# Patient Record
Sex: FEMALE | Race: WHITE | Employment: PART TIME | URBAN - METROPOLITAN AREA
[De-identification: names, ages, dates, MRNs, and addresses within clinical notes are randomized per-mention and may not be internally consistent; named-entity substitution may affect disease eponyms.]

---

## 2024-01-23 ENCOUNTER — APPOINTMENT (OUTPATIENT)
Dept: CT IMAGING | Age: 69
DRG: 552 | End: 2024-01-23
Payer: MEDICARE

## 2024-01-23 ENCOUNTER — APPOINTMENT (OUTPATIENT)
Dept: MRI IMAGING | Age: 69
DRG: 552 | End: 2024-01-23
Payer: MEDICARE

## 2024-01-23 ENCOUNTER — HOSPITAL ENCOUNTER (INPATIENT)
Age: 69
LOS: 1 days | Discharge: HOME | DRG: 552 | End: 2024-01-24
Attending: EMERGENCY MEDICINE | Admitting: SURGERY
Payer: MEDICARE

## 2024-01-23 DIAGNOSIS — S12.100A ODONTOID FRACTURE, CLOSED, INITIAL ENCOUNTER (HCC): ICD-10-CM

## 2024-01-23 DIAGNOSIS — S12.110A CLOSED TYPE II FRACTURE OF ODONTOID PROCESS (HCC): Primary | ICD-10-CM
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
## 2024-01-23 LAB
25(OH)D3 SERPL-MCNC: 42.2 NG/ML
ALBUMIN SERPL-MCNC: 4.1 G/DL (ref 3.5–5.2)
ANION GAP SERPL CALCULATED.3IONS-SCNC: 13 MMOL/L (ref 9–17)
BODY TEMPERATURE: 37
BUN SERPL-MCNC: 17 MG/DL (ref 8–23)
CHLORIDE SERPL-SCNC: 105 MMOL/L (ref 98–107)
CO2 SERPL-SCNC: 22 MMOL/L (ref 20–31)
COHGB MFR BLD: 2.7 % (ref 0–5)
CREAT SERPL-MCNC: 0.9 MG/DL (ref 0.5–0.9)
ERYTHROCYTE [DISTWIDTH] IN BLOOD BY AUTOMATED COUNT: 13.2 % (ref 11.8–14.4)
EST. AVERAGE GLUCOSE BLD GHB EST-MCNC: 128 MG/DL
ETHANOL PERCENT: <0.01 %
ETHANOLAMINE SERPL-MCNC: <10 MG/DL
FIO2 ON VENT: ABNORMAL %
GFR SERPL CREATININE-BSD FRML MDRD: >60 ML/MIN/1.73M2
GLUCOSE SERPL-MCNC: 102 MG/DL (ref 70–99)
HBA1C MFR BLD: 6.1 % (ref 4–6)
HCG SERPL QL: NEGATIVE
HCO3 VENOUS: 24 MMOL/L (ref 24–30)
HCT VFR BLD AUTO: 43.6 % (ref 36.3–47.1)
HGB BLD-MCNC: 14.2 G/DL (ref 11.9–15.1)
INR PPP: 1.1
MCH RBC QN AUTO: 29.9 PG (ref 25.2–33.5)
MCHC RBC AUTO-ENTMCNC: 32.6 G/DL (ref 28.4–34.8)
MCV RBC AUTO: 91.8 FL (ref 82.6–102.9)
NEGATIVE BASE EXCESS, VEN: 0.6 MMOL/L (ref 0–2)
NRBC BLD-RTO: 0 PER 100 WBC
O2 SAT, VEN: 84.4 % (ref 60–85)
PARTIAL THROMBOPLASTIN TIME: 27.1 SEC (ref 23–36.5)
PCO2, VEN: 41.4 MM HG (ref 39–55)
PH VENOUS: 7.38 (ref 7.32–7.42)
PLATELET # BLD AUTO: 202 K/UL (ref 138–453)
PMV BLD AUTO: 10 FL (ref 8.1–13.5)
PO2, VEN: 48 MM HG (ref 30–50)
POTASSIUM SERPL-SCNC: 3.4 MMOL/L (ref 3.7–5.3)
PROTHROMBIN TIME: 14.3 SEC (ref 11.7–14.9)
RBC # BLD AUTO: 4.75 M/UL (ref 3.95–5.11)
SODIUM SERPL-SCNC: 140 MMOL/L (ref 135–144)
T4 FREE SERPL-MCNC: 1.3 NG/DL (ref 0.9–1.7)
TSH SERPL DL<=0.05 MIU/L-ACNC: 1.46 UIU/ML (ref 0.3–5)
WBC OTHER # BLD: 9.1 K/UL (ref 3.5–11.3)

## 2024-01-23 PROCEDURE — 6360000004 HC RX CONTRAST MEDICATION: Performed by: NURSE PRACTITIONER

## 2024-01-23 PROCEDURE — 84439 ASSAY OF FREE THYROXINE: CPT

## 2024-01-23 PROCEDURE — 85610 PROTHROMBIN TIME: CPT

## 2024-01-23 PROCEDURE — 84520 ASSAY OF UREA NITROGEN: CPT

## 2024-01-23 PROCEDURE — 82805 BLOOD GASES W/O2 SATURATION: CPT

## 2024-01-23 PROCEDURE — 70498 CT ANGIOGRAPHY NECK: CPT

## 2024-01-23 PROCEDURE — 85730 THROMBOPLASTIN TIME PARTIAL: CPT

## 2024-01-23 PROCEDURE — 84443 ASSAY THYROID STIM HORMONE: CPT

## 2024-01-23 PROCEDURE — 85027 COMPLETE CBC AUTOMATED: CPT

## 2024-01-23 PROCEDURE — 2580000003 HC RX 258: Performed by: NURSE PRACTITIONER

## 2024-01-23 PROCEDURE — 84703 CHORIONIC GONADOTROPIN ASSAY: CPT

## 2024-01-23 PROCEDURE — 82565 ASSAY OF CREATININE: CPT

## 2024-01-23 PROCEDURE — 1200000000 HC SEMI PRIVATE

## 2024-01-23 PROCEDURE — 82040 ASSAY OF SERUM ALBUMIN: CPT

## 2024-01-23 PROCEDURE — G0480 DRUG TEST DEF 1-7 CLASSES: HCPCS

## 2024-01-23 PROCEDURE — 6370000000 HC RX 637 (ALT 250 FOR IP): Performed by: NURSE PRACTITIONER

## 2024-01-23 PROCEDURE — 83036 HEMOGLOBIN GLYCOSYLATED A1C: CPT

## 2024-01-23 PROCEDURE — 72141 MRI NECK SPINE W/O DYE: CPT

## 2024-01-23 PROCEDURE — 6370000000 HC RX 637 (ALT 250 FOR IP)

## 2024-01-23 PROCEDURE — 82306 VITAMIN D 25 HYDROXY: CPT

## 2024-01-23 PROCEDURE — 80051 ELECTROLYTE PANEL: CPT

## 2024-01-23 PROCEDURE — 99285 EMERGENCY DEPT VISIT HI MDM: CPT

## 2024-01-23 PROCEDURE — 99284 EMERGENCY DEPT VISIT MOD MDM: CPT | Performed by: SURGERY

## 2024-01-23 PROCEDURE — 82947 ASSAY GLUCOSE BLOOD QUANT: CPT

## 2024-01-23 RX ORDER — OMEGA-3S/DHA/EPA/FISH OIL/D3 300MG-1000
400 CAPSULE ORAL DAILY
Status: DISCONTINUED | OUTPATIENT
Start: 2024-01-23 | End: 2024-01-24 | Stop reason: HOSPADM

## 2024-01-23 RX ORDER — POTASSIUM CHLORIDE 29.8 MG/ML
20 INJECTION INTRAVENOUS PRN
Status: DISCONTINUED | OUTPATIENT
Start: 2024-01-23 | End: 2024-01-24 | Stop reason: HOSPADM

## 2024-01-23 RX ORDER — ONDANSETRON 2 MG/ML
4 INJECTION INTRAMUSCULAR; INTRAVENOUS EVERY 6 HOURS PRN
Status: DISCONTINUED | OUTPATIENT
Start: 2024-01-23 | End: 2024-01-24 | Stop reason: HOSPADM

## 2024-01-23 RX ORDER — POLYETHYLENE GLYCOL 3350 17 G/17G
17 POWDER, FOR SOLUTION ORAL DAILY
Status: DISCONTINUED | OUTPATIENT
Start: 2024-01-23 | End: 2024-01-24 | Stop reason: HOSPADM

## 2024-01-23 RX ORDER — ONDANSETRON 4 MG/1
4 TABLET, ORALLY DISINTEGRATING ORAL EVERY 8 HOURS PRN
Status: DISCONTINUED | OUTPATIENT
Start: 2024-01-23 | End: 2024-01-24 | Stop reason: HOSPADM

## 2024-01-23 RX ORDER — SODIUM CHLORIDE 0.9 % (FLUSH) 0.9 %
5-40 SYRINGE (ML) INJECTION EVERY 12 HOURS SCHEDULED
Status: DISCONTINUED | OUTPATIENT
Start: 2024-01-23 | End: 2024-01-24 | Stop reason: HOSPADM

## 2024-01-23 RX ORDER — METHOCARBAMOL 750 MG/1
750 TABLET, FILM COATED ORAL EVERY 8 HOURS
Status: DISCONTINUED | OUTPATIENT
Start: 2024-01-23 | End: 2024-01-24 | Stop reason: HOSPADM

## 2024-01-23 RX ORDER — SODIUM CHLORIDE 9 MG/ML
INJECTION, SOLUTION INTRAVENOUS CONTINUOUS
Status: CANCELLED | OUTPATIENT
Start: 2024-01-23

## 2024-01-23 RX ORDER — ATORVASTATIN CALCIUM 10 MG/1
10 TABLET, FILM COATED ORAL EVERY EVENING
COMMUNITY

## 2024-01-23 RX ORDER — RAMIPRIL 10 MG/1
10 CAPSULE ORAL EVERY EVENING
COMMUNITY

## 2024-01-23 RX ORDER — AMLODIPINE BESYLATE 5 MG/1
5 TABLET ORAL EVERY EVENING
COMMUNITY

## 2024-01-23 RX ORDER — SODIUM CHLORIDE 0.9 % (FLUSH) 0.9 %
10 SYRINGE (ML) INJECTION PRN
Status: DISCONTINUED | OUTPATIENT
Start: 2024-01-23 | End: 2024-01-24 | Stop reason: HOSPADM

## 2024-01-23 RX ORDER — OXYCODONE HYDROCHLORIDE 5 MG/1
5 TABLET ORAL ONCE
Status: COMPLETED | OUTPATIENT
Start: 2024-01-23 | End: 2024-01-23

## 2024-01-23 RX ORDER — ASPIRIN 81 MG/1
81 TABLET, CHEWABLE ORAL ONCE
Status: COMPLETED | OUTPATIENT
Start: 2024-01-23 | End: 2024-01-23

## 2024-01-23 RX ORDER — ACETAMINOPHEN 325 MG/1
650 TABLET ORAL EVERY 4 HOURS PRN
Status: DISCONTINUED | OUTPATIENT
Start: 2024-01-23 | End: 2024-01-24 | Stop reason: HOSPADM

## 2024-01-23 RX ORDER — AMLODIPINE BESYLATE 5 MG/1
5 TABLET ORAL NIGHTLY
Status: DISCONTINUED | OUTPATIENT
Start: 2024-01-23 | End: 2024-01-24 | Stop reason: HOSPADM

## 2024-01-23 RX ORDER — OXYCODONE HYDROCHLORIDE 5 MG/1
5 TABLET ORAL EVERY 4 HOURS PRN
Status: DISCONTINUED | OUTPATIENT
Start: 2024-01-23 | End: 2024-01-24 | Stop reason: HOSPADM

## 2024-01-23 RX ORDER — MAGNESIUM SULFATE IN WATER 40 MG/ML
2000 INJECTION, SOLUTION INTRAVENOUS PRN
Status: DISCONTINUED | OUTPATIENT
Start: 2024-01-23 | End: 2024-01-24 | Stop reason: HOSPADM

## 2024-01-23 RX ORDER — GABAPENTIN 100 MG/1
100 CAPSULE ORAL 3 TIMES DAILY
Status: DISCONTINUED | OUTPATIENT
Start: 2024-01-23 | End: 2024-01-24 | Stop reason: HOSPADM

## 2024-01-23 RX ORDER — ASPIRIN 81 MG/1
81 TABLET ORAL EVERY EVENING
COMMUNITY

## 2024-01-23 RX ORDER — ASCORBIC ACID 500 MG
500 TABLET ORAL DAILY
Status: DISCONTINUED | OUTPATIENT
Start: 2024-01-23 | End: 2024-01-24 | Stop reason: HOSPADM

## 2024-01-23 RX ORDER — SODIUM CHLORIDE 9 MG/ML
INJECTION, SOLUTION INTRAVENOUS PRN
Status: DISCONTINUED | OUTPATIENT
Start: 2024-01-23 | End: 2024-01-24 | Stop reason: HOSPADM

## 2024-01-23 RX ORDER — LISINOPRIL 10 MG/1
10 TABLET ORAL DAILY
Status: DISCONTINUED | OUTPATIENT
Start: 2024-01-23 | End: 2024-01-24 | Stop reason: HOSPADM

## 2024-01-23 RX ORDER — ATORVASTATIN CALCIUM 20 MG/1
10 TABLET, FILM COATED ORAL NIGHTLY
Status: DISCONTINUED | OUTPATIENT
Start: 2024-01-23 | End: 2024-01-24 | Stop reason: HOSPADM

## 2024-01-23 RX ORDER — POTASSIUM CHLORIDE 7.45 MG/ML
10 INJECTION INTRAVENOUS PRN
Status: DISCONTINUED | OUTPATIENT
Start: 2024-01-23 | End: 2024-01-24 | Stop reason: HOSPADM

## 2024-01-23 RX ADMIN — GABAPENTIN 100 MG: 100 CAPSULE ORAL at 20:34

## 2024-01-23 RX ADMIN — OXYCODONE HYDROCHLORIDE AND ACETAMINOPHEN 500 MG: 500 TABLET ORAL at 20:53

## 2024-01-23 RX ADMIN — LISINOPRIL 10 MG: 10 TABLET ORAL at 20:34

## 2024-01-23 RX ADMIN — ATORVASTATIN CALCIUM 10 MG: 10 TABLET, FILM COATED ORAL at 20:34

## 2024-01-23 RX ADMIN — OXYCODONE HYDROCHLORIDE 5 MG: 5 TABLET ORAL at 14:09

## 2024-01-23 RX ADMIN — ACETAMINOPHEN 650 MG: 325 TABLET ORAL at 20:33

## 2024-01-23 RX ADMIN — ASPIRIN 81 MG: 81 TABLET, CHEWABLE ORAL at 20:34

## 2024-01-23 RX ADMIN — SODIUM CHLORIDE, PRESERVATIVE FREE 10 ML: 5 INJECTION INTRAVENOUS at 21:19

## 2024-01-23 RX ADMIN — AMLODIPINE BESYLATE 5 MG: 10 TABLET ORAL at 20:31

## 2024-01-23 RX ADMIN — IOPAMIDOL 90 ML: 755 INJECTION, SOLUTION INTRAVENOUS at 15:21

## 2024-01-23 RX ADMIN — CHOLECALCIFEROL TAB 10 MCG (400 UNIT) 400 UNITS: 10 TAB at 20:53

## 2024-01-23 ASSESSMENT — ENCOUNTER SYMPTOMS
RESPIRATORY NEGATIVE: 1
BACK PAIN: 0
GASTROINTESTINAL NEGATIVE: 1
EYES NEGATIVE: 1

## 2024-01-23 ASSESSMENT — PAIN SCALES - GENERAL
PAINLEVEL_OUTOF10: 8
PAINLEVEL_OUTOF10: 5
PAINLEVEL_OUTOF10: 5

## 2024-01-23 ASSESSMENT — PAIN DESCRIPTION - LOCATION
LOCATION: NECK
LOCATION: HEAD;NECK

## 2024-01-23 ASSESSMENT — PAIN DESCRIPTION - DESCRIPTORS: DESCRIPTORS: ACHING

## 2024-01-23 ASSESSMENT — PAIN DESCRIPTION - ORIENTATION
ORIENTATION: MID
ORIENTATION: MID;LEFT;RIGHT

## 2024-01-23 NOTE — PROGRESS NOTES
Trauma Tertiary Survey    Admit Date: 1/23/2024  Hospital day 0      Subjective:     Patient seen and examined at bedside. Aspen collar on, no pain.     Objective:     Physical Exam  HENT:      Head: Normocephalic.      Right Ear: Tympanic membrane normal.      Left Ear: Tympanic membrane normal.      Mouth/Throat:      Mouth: Mucous membranes are moist.      Pharynx: Oropharynx is clear.   Eyes:      Extraocular Movements: Extraocular movements intact.      Pupils: Pupils are equal, round, and reactive to light.   Neck:      Comments: Aspen collar. Midline cervical tenderness.   Cardiovascular:      Rate and Rhythm: Normal rate.   Pulmonary:      Effort: Pulmonary effort is normal.   Abdominal:      Palpations: Abdomen is soft.   Musculoskeletal:      Comments: weakness in left foot   Skin:     Comments: Ecchymosis overlying dorsal aspect of left hand    Neurological:      Mental Status: She is alert and oriented to person, place, and time.     Spine:     Spine Tenderness ROM   Cervical 0 /10 Aspen Collar   Thoracic 0 /10 Aspen Collar   Lumbar 0 /10 [place] Collar     Musculoskeletal    Joint Tenderness Swelling ROM   Right shoulder absent absent normal   Left shoulder absent absent normal   Right elbow absent absent normal   Left elbow absent absent normal   Right wrist absent absent normal   Left wrist absent absent normal   Right hand grasp absent absent normal   Left hand grasp absent absent normal   Right hip absent absent normal   Left hip absent absent normal   Right knee absent absent normal   Left knee absent absent normal   Right ankle absent absent normal   Left ankle absent absent normal   Right foot absent absent normal   Left foot absent absent normal       CONSULTS: Neurosurgery     PROCEDURES: None     [x] Reviewed radiology reports  (All radiology findings correlate to diagnoses/problem list)    [] Incidental findings:    [] Patient/family notified and letter given    Assessment/Plan:     Minimally

## 2024-01-23 NOTE — ED PROVIDER NOTES
[facility]  Emergency Department  Faculty Attestation     I performed a history and physical examination of the patient and discussed management with the resident. I reviewed the resident’s note and agree with the documented findings and plan of care. Any areas of disagreement are noted on the chart. I was personally present for the key portions of any procedures. I have documented in the chart those procedures where I was not present during the key portions. I have reviewed the emergency nurses triage note. I agree with the chief complaint, past medical history, past surgical history, allergies, medications, social and family history as documented unless otherwise noted below.    For Physician Assistant/ Nurse Practitioner cases/documentation I have personally evaluated this patient and have completed at least one if not all key elements of the E/M (history, physical exam, and MDM). Additional findings are as noted.    Preliminary note started at 1:41 PM EST    Primary Care Physician:  No primary care provider on file.    Screenings:  [unfilled]    CHIEF COMPLAINT       Chief Complaint   Patient presents with    Neck Injury     Odontoid fracture       RECENT VITALS:   There were no vitals taken for this visit.    LABS:  Labs Reviewed - No data to display    Radiology  No orders to display         Attending Physician Additional  Notes    Patient is transferred for consultations for type II odontoid fracture.  She bumped her head several days ago and has persistent neck pain.  No loss of consciousness or neurologic complaints.  No numbness or weakness.  She has minimal residual deficits after a an old stroke with mild left-sided weakness more foot drop and hand weakness.  No chest pain shortness of breath.  No use of anticoagulation.  She bumped her right knee and has some bruising but full range of movement and able to bear weight.  She had CT brain which was negative at

## 2024-01-23 NOTE — PROGRESS NOTES
15 Variable Trauma-Specific Frailty Index   Comorbidities   Cancer History Yes (1) No (0)   Coronary Heart Disease MI (1) CABG (0.75) Mild (0.25)  No (0)   Dementia Severe (1) Moderate (0.5) Mild (0.25)  No (0)   Daily Activities   Help With Grooming Yes (1) No (0)   Help with Managing Money Yes (1) No (0)   Help doing Housework Yes (1) No (0)   Help with Toileting Yes (1) No (0)   Help Walking Wheelchair (1) Walker (0.75) Cane (0.5) No (0)   Health Attitude   Feel Less Useful Most Time (1) Sometimes (0.5) Never (0)   Feel Sad Most Time (1) Sometimes (0.5) Never (0)   Feel Effort to Do Everything Most Time (1) Sometimes (0.5) Never (0)   Feels Lonely Most Time (1) Sometimes (0.5) Never (0)   Falls Most Time (1) Sometimes (0.5) Never (0)   Function   Sexually Active Yes (0)  No(1)   Nutrition   Albumin < 3g/dL (1)  > 3g/dL   Scoring   Score   FI (Score/15)   > 0.25 = Frail   *Based on 2-weeks prior to hospital admission   Trauma Specific Fraility Index > or = to 4 (4/15 = 0.26)  Trauma Specific Fraility Index Score:    0

## 2024-01-23 NOTE — H&P
Neurological:      Mental Status: She is alert and oriented to person, place, and time.        RADIOLOGY  CTA NECK W CONTRAST    (Results Pending)       LABS  Labs Reviewed   HEMOGLOBIN A1C - Abnormal; Notable for the following components:       Result Value    Hemoglobin A1C 6.1 (*)     All other components within normal limits   TRAUMA PANEL   ALBUMIN   TSH WITH REFLEX   T4, FREE   VITAMIN D 25 HYDROXY       Emiliano Cui MD  3:09 PM  01/23/24

## 2024-01-23 NOTE — PROGRESS NOTES
[facility]     Emergency/Trauma Note    PATIENT NAME: Frieda Staples    Shift date: 2024   Shift day: Tuesday   Shift # 1    Room #    Name: Frieda Staples            Age: 68 y.o.  Gender: female          Buddhism: Tenriism   Place of Anabaptist:     Trauma/Incident type: Adult Trauma Consult  Admit Date & Time: 2024  1:32 PM      PATIENT/EVENT DESCRIPTION:  Frieda Staples is a 68 y.o. female who arrived as a transfer from [facility]. Pt fell at home two days ago.  Pt to be admitted to .         SPIRITUAL ASSESSMENT-INTERVENTION-OUTCOME:  Pt was awake and alert and wearing a C Collar. She engaged well in conversation discussing her injury and stating that she is declining surgery.  Pt is a PTA and feels she can handle her recovery without surgery. She spoke of having both knees replaced and of breaking her femur from a fall on ice a couple of years ago. Pt shared that both her  and brother  from falling and hitting their heads. She said she has no family and that friends will pick her up from here.  noticed after visit that a brother is listed as an emergency contact.  provided a supportive presence through active listening and words of affirmation.  assured pt of prayers and continued support as needed.      PATIENT BELONGINGS:  No belongings noted    ANY BELONGINGS OF SIGNIFICANT VALUE NOTED:      REGISTRATION STAFF NOTIFIED?  No      WHAT IS YOUR SPIRITUAL CARE PLAN FOR THIS PATIENT?:   Chaplains will remain available to offer spiritual and emotional support as needed.     Electronically signed by Chaplain Jason, on 2024 at 3:55 PM.  [facility]  [phone]

## 2024-01-23 NOTE — ED PROVIDER NOTES
[facility] ED  Emergency Department Encounter  Emergency Medicine Resident     Pt Name:Frieda Staples  MRN: [id number]  Birthdate 1955  Date of evaluation: 1/23/24  PCP:  No primary care provider on file.  Note Started: 2:49 PM EST      CHIEF COMPLAINT       Chief Complaint   Patient presents with    Neck Injury     Odontoid fracture       HISTORY OF PRESENT ILLNESS  (Location/Symptom, Timing/Onset, Context/Setting, Quality, Duration, Modifying Factors, Severity.)      Frieda Staples is a 68 y.o. female with past medical history of hypertension and prior stroke with minimal left-sided residual deficits who presents as a transfer from [facility] for further evaluation of neck fracture.  Patient explains that she had a mechanical trip and fall on Sunday, 2 days ago, and hit her head on a door.  She denies losing consciousness.  Reports that she was able to get up and ambulate without difficulty.  States that she helped the pain would resolve but it was ongoing, prompting her to present to the ED today for further evaluation.  She was found to have a type II odontoid fracture.  Transferred here for further evaluation.  Here, patient rates her pain as a 5 out of 10 in severity while at rest but a 10 out of 10 in severity with movement.  She arrives in a hard cervical collar.  She denies pain elsewhere.    PAST MEDICAL / SURGICAL / SOCIAL / FAMILY HISTORY      has no past medical history on file.       has a past surgical history that includes Hysterectomy.      Social History     Socioeconomic History    Marital status:      Spouse name: Not on file    Number of children: Not on file    Years of education: Not on file    Highest education level: Not on file   Occupational History    Not on file   Tobacco Use    Smoking status: Never    Smokeless tobacco: Not on file   Substance and Sexual Activity    Alcohol use: Never    Drug use: Never    Sexual activity: Not on file   Other

## 2024-01-24 VITALS
SYSTOLIC BLOOD PRESSURE: 133 MMHG | HEIGHT: 60 IN | TEMPERATURE: 97.9 F | DIASTOLIC BLOOD PRESSURE: 77 MMHG | WEIGHT: 150 LBS | OXYGEN SATURATION: 95 % | HEART RATE: 62 BPM | BODY MASS INDEX: 29.45 KG/M2 | RESPIRATION RATE: 14 BRPM

## 2024-01-24 LAB
ANION GAP SERPL CALCULATED.3IONS-SCNC: 11 MMOL/L (ref 9–17)
BASOPHILS # BLD: <0.03 K/UL (ref 0–0.2)
BASOPHILS NFR BLD: 0 % (ref 0–2)
BUN SERPL-MCNC: 21 MG/DL (ref 8–23)
CALCIUM SERPL-MCNC: 8.7 MG/DL (ref 8.6–10.4)
CHLORIDE SERPL-SCNC: 107 MMOL/L (ref 98–107)
CO2 SERPL-SCNC: 23 MMOL/L (ref 20–31)
CREAT SERPL-MCNC: 0.9 MG/DL (ref 0.5–0.9)
EOSINOPHIL # BLD: 0.05 K/UL (ref 0–0.44)
EOSINOPHILS RELATIVE PERCENT: 1 % (ref 1–4)
ERYTHROCYTE [DISTWIDTH] IN BLOOD BY AUTOMATED COUNT: 13.3 % (ref 11.8–14.4)
GFR SERPL CREATININE-BSD FRML MDRD: >60 ML/MIN/1.73M2
GLUCOSE SERPL-MCNC: 81 MG/DL (ref 70–99)
HCT VFR BLD AUTO: 41.3 % (ref 36.3–47.1)
HGB BLD-MCNC: 13 G/DL (ref 11.9–15.1)
IMM GRANULOCYTES # BLD AUTO: <0.03 K/UL (ref 0–0.3)
IMM GRANULOCYTES NFR BLD: 0 %
LYMPHOCYTES NFR BLD: 1.6 K/UL (ref 1.1–3.7)
LYMPHOCYTES RELATIVE PERCENT: 23 % (ref 24–43)
MCH RBC QN AUTO: 29.4 PG (ref 25.2–33.5)
MCHC RBC AUTO-ENTMCNC: 31.5 G/DL (ref 28.4–34.8)
MCV RBC AUTO: 93.4 FL (ref 82.6–102.9)
MONOCYTES NFR BLD: 0.84 K/UL (ref 0.1–1.2)
MONOCYTES NFR BLD: 12 % (ref 3–12)
NEUTROPHILS NFR BLD: 64 % (ref 36–65)
NEUTS SEG NFR BLD: 4.42 K/UL (ref 1.5–8.1)
NRBC BLD-RTO: 0 PER 100 WBC
PLATELET # BLD AUTO: 188 K/UL (ref 138–453)
PMV BLD AUTO: 10.4 FL (ref 8.1–13.5)
POTASSIUM SERPL-SCNC: 3.7 MMOL/L (ref 3.7–5.3)
RBC # BLD AUTO: 4.42 M/UL (ref 3.95–5.11)
SODIUM SERPL-SCNC: 141 MMOL/L (ref 135–144)
WBC OTHER # BLD: 7 K/UL (ref 3.5–11.3)

## 2024-01-24 PROCEDURE — 36415 COLL VENOUS BLD VENIPUNCTURE: CPT

## 2024-01-24 PROCEDURE — 97162 PT EVAL MOD COMPLEX 30 MIN: CPT

## 2024-01-24 PROCEDURE — 6370000000 HC RX 637 (ALT 250 FOR IP): Performed by: NURSE PRACTITIONER

## 2024-01-24 PROCEDURE — 85025 COMPLETE CBC W/AUTO DIFF WBC: CPT

## 2024-01-24 PROCEDURE — 96127 BRIEF EMOTIONAL/BEHAV ASSMT: CPT | Performed by: SOCIAL WORKER

## 2024-01-24 PROCEDURE — 6370000000 HC RX 637 (ALT 250 FOR IP)

## 2024-01-24 PROCEDURE — 97530 THERAPEUTIC ACTIVITIES: CPT

## 2024-01-24 PROCEDURE — 97166 OT EVAL MOD COMPLEX 45 MIN: CPT

## 2024-01-24 PROCEDURE — 97535 SELF CARE MNGMENT TRAINING: CPT

## 2024-01-24 PROCEDURE — 2580000003 HC RX 258: Performed by: NURSE PRACTITIONER

## 2024-01-24 PROCEDURE — 80048 BASIC METABOLIC PNL TOTAL CA: CPT

## 2024-01-24 RX ORDER — GABAPENTIN 100 MG/1
100 CAPSULE ORAL 3 TIMES DAILY
Qty: 21 CAPSULE | Refills: 0 | Status: SHIPPED | OUTPATIENT
Start: 2024-01-24 | End: 2024-01-24

## 2024-01-24 RX ORDER — GABAPENTIN 100 MG/1
100 CAPSULE ORAL 3 TIMES DAILY
Qty: 21 CAPSULE | Refills: 0 | Status: SHIPPED | OUTPATIENT
Start: 2024-01-24 | End: 2024-01-31

## 2024-01-24 RX ORDER — METHOCARBAMOL 750 MG/1
750 TABLET, FILM COATED ORAL EVERY 8 HOURS
Qty: 21 TABLET | Refills: 0 | Status: SHIPPED | OUTPATIENT
Start: 2024-01-24 | End: 2024-01-24

## 2024-01-24 RX ORDER — METHOCARBAMOL 750 MG/1
750 TABLET, FILM COATED ORAL EVERY 8 HOURS
Qty: 21 TABLET | Refills: 0 | Status: SHIPPED | OUTPATIENT
Start: 2024-01-24 | End: 2024-01-31

## 2024-01-24 RX ORDER — ENOXAPARIN SODIUM 100 MG/ML
30 INJECTION SUBCUTANEOUS 2 TIMES DAILY
Status: DISCONTINUED | OUTPATIENT
Start: 2024-01-24 | End: 2024-01-24 | Stop reason: HOSPADM

## 2024-01-24 RX ADMIN — LISINOPRIL 10 MG: 10 TABLET ORAL at 10:56

## 2024-01-24 RX ADMIN — GABAPENTIN 100 MG: 100 CAPSULE ORAL at 10:55

## 2024-01-24 RX ADMIN — OXYCODONE HYDROCHLORIDE AND ACETAMINOPHEN 500 MG: 500 TABLET ORAL at 10:56

## 2024-01-24 RX ADMIN — METHOCARBAMOL 750 MG: 750 TABLET ORAL at 10:56

## 2024-01-24 RX ADMIN — METHOCARBAMOL 750 MG: 750 TABLET ORAL at 01:32

## 2024-01-24 RX ADMIN — SODIUM CHLORIDE, PRESERVATIVE FREE 10 ML: 5 INJECTION INTRAVENOUS at 11:05

## 2024-01-24 RX ADMIN — CHOLECALCIFEROL TAB 10 MCG (400 UNIT) 400 UNITS: 10 TAB at 10:56

## 2024-01-24 RX ADMIN — GABAPENTIN 100 MG: 100 CAPSULE ORAL at 15:00

## 2024-01-24 ASSESSMENT — PAIN - FUNCTIONAL ASSESSMENT: PAIN_FUNCTIONAL_ASSESSMENT: ACTIVITIES ARE NOT PREVENTED

## 2024-01-24 ASSESSMENT — PAIN DESCRIPTION - DESCRIPTORS: DESCRIPTORS: SHARP

## 2024-01-24 ASSESSMENT — PAIN DESCRIPTION - LOCATION: LOCATION: BACK;NECK

## 2024-01-24 ASSESSMENT — PAIN SCALES - GENERAL: PAINLEVEL_OUTOF10: 5

## 2024-01-24 NOTE — DISCHARGE INSTRUCTIONS
Discharge Instructions for Trauma       What to do after you leave the hospital:    You sustained a head injury during your recent traumatic event. Please refrain from any activities that could put you at risk for further injury to your head as you heal over the next 3-4 weeks (such as ladders, contact sports, 4-tao/ATV activities, etc.) You received a cognitive evaluation while hospitalized-follow all instructions given by the speech-language pathologist.   For additional support and resources for you and your family contact the [facility] at [phone]. This center offers additional therapy, support groups, education and other resources at no cost. The center is located at [address] [place] [address]. You can also visit www.tbirc.org for more information.         For resources transitioning back to the community following your trauma, visit [email or web address]    General questions or concerns please call the Trauma and General Surgery Clinic at [phone].  If needed, the clinic fax number is [phone].    Trauma is a life-threatening condition. Your doctor will want to closely monitor you. Be sure to go to all of your appointments.                     Spinal Fracture Discharge Instructions     Thank you for choosing [facility] and [facility] for your recovery needs. The following instructions will help to ensure your comfort and that you are well prepared for recovery.       [x] Please obtain upright x-rays of your Cervical spine 1-2 days prior to appointment.  Please also call your primary care physician to schedule an appointment for further evaluation and care.       Diet:   You may resume your regular diet.   Be sure to eat a well-balanced diet. Protein promotes wound healing.   Pain medication and decreased activity can cause constipation. Drink 8-10 glasses of water a day, eat fresh fruits

## 2024-01-24 NOTE — DISCHARGE SUMMARY
foraminal narrowing. C3-C4: Disc osteophyte complex mildly indents the thecal sac without significant canal narrowing.  Severe left and moderate right foraminal stenosis related to uncovertebral and facet hypertrophy. C4-C5: Disc osteophyte complex contributes to mild canal stenosis.  Severe left and mild right foraminal stenosis related to uncovertebral and facet hypertrophy. C5-C6: Disc osteophyte complex and dorsal ligamentous hypertrophy resulting in severe canal stenosis.  Severe left and moderate right foraminal stenosis related to uncovertebral and facet hypertrophy. C6-C7: Disc osteophyte complex contributes to moderate canal stenosis. Moderate bilateral foraminal stenosis related to uncovertebral and facet hypertrophy. C7-T1: Disc osteophyte complex and dorsal ligamentous hypertrophy contributes to moderate to severe canal narrowing..  Moderate left and mild right foraminal stenosis related to uncovertebral and facet hypertrophy.     1. Again seen is an acute type 2 dens fracture with mild posterior angulation and minimal dorsal displacement. The major stabilizer ligaments of the dens are intact. 2. Bilateral C1 posterior arch fractures better evaluated on the recent CT. 3. Severe canal stenosis at C5-C6. Moderate to severe canal stenosis at C7-T1. 4. Severe left foraminal stenosis at C3-C4, C4-C5 and C5-C6.     CTA NECK W CONTRAST    Result Date: 1/23/2024  EXAMINATION: CTA OF THE NECK 1/23/2024 3:16 pm TECHNIQUE: CTA of the neck was performed with the administration of intravenous contrast. Multiplanar reformatted images are provided for review.  MIP images are provided for review. Stenosis of the internal carotid arteries measured using NASCET criteria. Automated exposure control, iterative reconstruction, and/or weight based adjustment of the mA/kV was utilized to reduce the radiation dose to as low as reasonably achievable. COMPARISON: None. HISTORY: ORDERING SYSTEM PROVIDED HISTORY: trauma, hit head

## 2024-01-24 NOTE — PROGRESS NOTES
[facility] Progress [facility]  SHEILA POMPA   1/24/2024    [facility]  1955  [id number]      Time spent with Patient: 0 minutes    This writer was unable to complete screen or therapy services with patient at bedside at this time due to the following:  Other service provider in room / procedure being performed

## 2024-01-24 NOTE — ED NOTES
Patient to room 33  Patient is a 68 year old female  Patient tripped, fell forward and hit forehead against wall on Sunday  Patient presented to [facility] today  CT scans indicated Odontoid fracture and arch fracture  Patient was transported to [facility] with [place] collar on  Patient ambulated to stretcher   Patient verbalizes pain only mostly only when walkling  or moving but not when at rest  Pt alert and oriented x4, talking in complete sentences, respirations even and unlabored.   Pt acting age appropriate.   White board updated, will continue to asses, call light at side  [place] given    
Patient to room 33 on EMS stretcher  Patient is a 68 year old female  Patient complains of   NAD  Pt alert and oriented x4, talking in complete sentences, respirations even and unlabored.   Pt acting age appropriate.   White board updated, will continue to asses, call light at side  [place] given    
Report given to Shannan MERRITT  Patient was eating dinner, resting quietly  Trauma was contacted for night time meds  
The following labs were labeled with appropriate pt sticker and tubed to lab: by me    [x] Blue     [x] Lavender   [] on ice  [x] Green/yellow  [x] Green/black [] on ice  [] Garcia  [] on ice  [x] Yellow  [] Red  [x] Pink  [] Type/ Screen  [] ABG  [] VBG    [] COVID-19 swab    [] Rapid  [] PCR  [] Flu swab  [] Peds Viral Panel     [] Urine Sample  [] Fecal Sample  [] Pelvic Cultures  [] Blood Cultures  [] X 2  [] STREP Cultures  [] Wound Cultures    
oxyCODONE (ROXICODONE) immediate release tablet 5 mg    sodium chloride flush 0.9 % injection 5-40 mL    sodium chloride flush 0.9 % injection 10 mL    0.9 % sodium chloride infusion    OR Linked Order Group     potassium chloride 20 mEq/50 mL IVPB (Central Line)     potassium chloride 10 mEq/100 mL IVPB (Peripheral Line)    magnesium sulfate 2000 mg in 50 mL IVPB premix    OR Linked Order Group     ondansetron (ZOFRAN-ODT) disintegrating tablet 4 mg     ondansetron (ZOFRAN) injection 4 mg    polyethylene glycol (GLYCOLAX) packet 17 g    acetaminophen (TYLENOL) tablet 650 mg    iopamidol (ISOVUE-370) 76 % injection 90 mL    oxyCODONE (ROXICODONE) immediate release tablet 5 mg    methocarbamol (ROBAXIN) tablet 750 mg    gabapentin (NEURONTIN) capsule 100 mg    amLODIPine (NORVASC) tablet 5 mg    atorvastatin (LIPITOR) tablet 10 mg    lisinopril (PRINIVIL;ZESTRIL) tablet 10 mg    vitamin D3 (CHOLECALCIFEROL) tablet 400 Units    ascorbic acid (VITAMIN C) tablet 500 mg    aspirin chewable tablet 81 mg       SURGICAL HISTORY       Past Surgical History:   Procedure Laterality Date    HYSTERECTOMY (CERVIX STATUS UNKNOWN)         PAST MEDICAL HISTORY     No past medical history on file.    Labs:  Labs Reviewed   TRAUMA PANEL - Abnormal; Notable for the following components:       Result Value    Glucose 102 (*)     Potassium 3.4 (*)     All other components within normal limits   HEMOGLOBIN A1C - Abnormal; Notable for the following components:    Hemoglobin A1C 6.1 (*)     All other components within normal limits   ALBUMIN   TSH WITH REFLEX   T4, FREE   VITAMIN D 25 HYDROXY   BASIC METABOLIC PANEL W/ REFLEX TO MG FOR LOW K   CBC WITH AUTO DIFFERENTIAL       Electronically signed by Natalie Modi RN on 1/23/2024 at 7:12 PM

## 2024-01-24 NOTE — CONSULTS
[facility] Inpatient Brief Diagnostic Assessment Note  SHER POMPA-S   1/24/2024    Frieda Rick [facility]  1955  [id number]      Time spent with Patient: 12 minutes     Pt was provided informed consent for the [facility]. Discussed with patient model of service to include the limits of confidentiality (i.e. abuse reporting, suicide intervention, etc.) and short-term intervention focused approach.  Pt indicated understanding.    This was not a virtual session      Presenting Patient Report:  Patient was screened at the request of the Trauma Team.   Patient presents as a 68 y.o. female subsequent to hospital admission following Fall resulting in injury.     Patient was able to complete the following screens: ITSS  Patient denies any current symptoms or concerns at this time.        MSE:     Appearance:   Hospital Gown, Good Hygiene, Visible Injuries: Cast, and Good Eye Contact  Appetite normal  Sleep disturbance No  Fatigue No  Loss of pleasure No  Impulsive behavior No  Speech    spontaneous, normal rate, normal volume, and well articulated  Mood    Euthymic, Hopefull / Future Oriented, and Positive  Affect    Appropriate to Context  Thought Content    intact  Thought Process    linear, goal directed, and coherent  Associations    logical connections  Insight    Fair  Judgment    Intact  Orientation    oriented to person, place, time, and general circumstances  Memory    recent and remote memory intact  Attention/Concentration    intact  Morbid ideation No  Suicide Assessment    no suicidal ideation      Assessment:  Patient denies any previous or current symptoms for depression or anxiety.  Patient scored low on Injured Trauma Survivor Screen (ITSS).  Patient does not meet criteria for depression or anxiety diagnosis at this time.       Screening Scale Results (If Any):    ITSS:  Date Screen Completed: 1/24/2024  Patient's score= 1 for PTSD risk. ( ? 2 is positive for PTSD risk. 
[penicillins]    Home Medications:  Prior to Admission medications    Not on File       Current Medications:   No current facility-administered medications for this encounter.    REVIEW OF SYSTEMS:       CONSTITUTIONAL: negative for fatigue and malaise   EYES: negative for double vision and photophobia    HEENT: negative for tinnitus and sore throat   RESPIRATORY: negative for cough, shortness of breath   CARDIOVASCULAR: negative for chest pain, palpitations   GASTROINTESTINAL: negative for nausea, vomiting   GENITOURINARY: negative for incontinence   MUSCULOSKELETAL: positive for neck pain, negative for back pain   NEUROLOGICAL: negative for seizures   PSYCHIATRIC: negative for agitated     Review of systems otherwise negative.    PHYSICAL EXAM:       BP (!) 140/89   Pulse 87   Temp 98.5 °F (36.9 °C) (Oral)   Resp 17   SpO2 97%       CONSTITUTIONAL: no apparent distress, appears stated age   HEAD: normocephalic, atraumatic   ENT: moist mucous membranes, uvula midline   NECK: In aspen collar    BACK: without midline tenderness, step-offs or deformities   NEUROLOGIC:    Mental Status:  A&Ox3    Motor Exam:      Motor exam is symmetrical 5 out of 5 all extremities bilaterally    Sensory:    Right Upper Extremity:  normal  Left Upper Extremity:  normal  Right Lower Extremity:  normal  Left Lower Extremity:  normal    Deep Tendon Reflexes:    Right Bicep:  2+  Left Bicep:  2+  Right Knee:  2+  Left Knee:  2+    Plantar Response:    Right:  downgoing  Left:  downgoing    Clonus:  absent  Rich's:  absent    Gait:  normal   SKIN: no rash       LABS AND IMAGING:     CBC with Differential:    Lab Results   Component Value Date/Time    WBC PENDING 01/23/2024 02:28 PM    RBC PENDING 01/23/2024 02:28 PM    HGB PENDING 01/23/2024 02:28 PM    HCT PENDING 01/23/2024 02:28 PM    PLT PENDING 01/23/2024 02:28 PM    MCV PENDING 01/23/2024 02:28 PM    MCH PENDING 01/23/2024 02:28 PM    MCHC PENDING 01/23/2024 02:28 PM    RDW

## 2024-01-24 NOTE — PROGRESS NOTES
Physical Therapy  Facility/Department: [address] BURN UNIT  Physical Therapy Initial Assessment    Name: Frieda Staples  : 1955  MRN: [id number]  Date of Service: 2024    Discharge Recommendations: Further therapy recommended at discharge.    Chief Complaint   Patient presents with    Neck Injury     Odontoid fracture     Minimally displaced type 2 dens C1 arch fracture               Neurosurgery Consult - appreciate recommendations                          CTA neck w/ contrast   Operative vs non-operative management   Maintain cervical alignment at all times with Aspen collar              Activity as tolerated  Neuro checks per protocol              MMPT              PT/OT      PT Equipment Recommendations  Equipment Needed: No      Patient Diagnosis(es): The primary encounter diagnosis was Closed type II fracture of odontoid process (HCC). A diagnosis of Odontoid fracture, closed, initial encounter (HCC) was also pertinent to this visit.  Past Medical History:  has no past medical history on file.  Past Surgical History:  has a past surgical history that includes Hysterectomy.    Assessment   Body Structures, Functions, Activity Limitations Requiring Skilled Therapeutic Intervention: Decreased functional mobility ;Decreased ADL status;Decreased body mechanics;Decreased strength;Decreased high-level IADLs;Decreased balance;Decreased endurance;Increased pain;Decreased coordination  Assessment: Pt ambulates 300 ft with RW and CGA. Pt should be safe to return to prior living situation with intermittent support as needed. pt would benefit from continued therapy to promote endurance, balance, and strengthening.  Therapy Prognosis: Good  Decision Making: Medium Complexity  Barriers to Learning: none  Requires PT Follow-Up: Yes  Activity Tolerance  Activity Tolerance: Patient limited by fatigue;Patient limited by endurance     Plan   Physical Therapy Plan  General Plan:  (5x)  Current Treatment Recommendations:

## 2024-01-24 NOTE — PLAN OF CARE
Problem: ABCDS Injury Assessment  Goal: Absence of physical injury  Outcome: Adequate for Discharge     Problem: Safety - Adult  Goal: Free from fall injury  Outcome: Adequate for Discharge

## 2024-01-24 NOTE — PROGRESS NOTES
Occupational Therapy  Facility/Department: [address] BURN UNIT  Occupational Therapy Initial Assessment    Name: Frieda Staples  : 1955  MRN: [id number]  Date of Service: 2024  Chief Complaint   Patient presents with    Neck Injury     Odontoid fracture       Discharge Recommendations:  Patient would benefit from continued therapy after discharge  OT Equipment Recommendations  Equipment Needed: No       Patient Diagnosis(es): The primary encounter diagnosis was Closed type II fracture of odontoid process (HCC). A diagnosis of Odontoid fracture, closed, initial encounter (HCC) was also pertinent to this visit.  Past Medical History:  has no past medical history on file.  Past Surgical History:  has a past surgical history that includes Hysterectomy.           Assessment   Performance deficits / Impairments: Decreased functional mobility ;Decreased ADL status;Decreased endurance;Decreased balance;Decreased high-level IADLs  Assessment: RN ok'd OT eval this date. Pt began session supine in bed. Pt sat unsupported EOB demo'd donning B socks with CGA for balance utilizing figure-4 method. Pt engaging in STS transfers and functional mobility with CGA and use of RW. Pt ended session seated in recliner. 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 Pt would continue to benefit from OT services to address deficits listed above and improve overall functional performance prior to discharge.  Prognosis: Good  Decision Making: Medium Complexity  REQUIRES OT FOLLOW-UP: Yes  Activity Tolerance  Activity Tolerance: Patient Tolerated treatment well        Plan   Occupational Therapy Plan  Times Per Week: 2-3x/wk  Current Treatment Recommendations: Balance training, Functional mobility training, Endurance training, Safety education & training, Patient/Caregiver education & training, Equipment evaluation, education, & procurement, Self-Care / ADL, Home management training

## 2024-01-24 NOTE — PROGRESS NOTES
Writer went over discharge information with patient at bedside with family also present. Patient verbalized understanding of discharge info and all questions were answered. RN wheeled patient down for ride via wheelchair with all belongings, including cell phone and glasses.

## 2024-01-24 NOTE — PROGRESS NOTES
PROGRESS NOTE          PATIENT NAME: Frieda Staples  MEDICAL RECORD NO. [id number]  DATE: 1/24/2024    HD: # 1      Patient Active Problem List   Diagnosis    Fall    Closed type II fracture of odontoid process (HCC)       DIAGNOSIS AND PLAN    Minimally displaced type 2 dens and C1 arch fx s/p fall  -non-op per NSG, Aspen collar x3 months and follow up with NSG in 2 weeks  -PT/OT  -MMPT    DVT ppx: Lovenox     Dispo: pending PT/OT recs, most likely home      Chief Complaint: \"I'm feeling better today\"    SUBJECTIVE    Patient seen and examined in the chair. She is feeling good today and denies pain. She is ready to go home and is asking when she can be discharged. PT has worked with her this morning. She is tolerating a diet and passing flatus. She denies chest pain, shortness of breath, n/v/abd pain.     OBJECTIVE  VITALS:   Vitals:    01/24/24 1056   BP: 133/77   Pulse: 62   Resp: 14   Temp: 97.9 °F (36.6 °C)   SpO2: 95%     Physical Exam  Vitals reviewed.   Constitutional:       General: She is not in acute distress.     Appearance: Normal appearance. She is not ill-appearing.      Interventions: Cervical collar in place.   Neck:      Comments:   Aspen collar for cervical spine stability   Cardiovascular:      Rate and Rhythm: Normal rate.   Pulmonary:      Effort: Pulmonary effort is normal.      Breath sounds: Normal breath sounds.   Abdominal:      General: Abdomen is flat. There is no distension.      Palpations: Abdomen is soft.      Tenderness: There is no abdominal tenderness.      Hernia: No hernia is present.   Skin:     General: Skin is cool.      Findings: No wound.   Neurological:      General: No focal deficit present.      Mental Status: She is alert and oriented to person, place, and time.       LAB:  CBC:   Recent Labs     01/23/24  1428 01/24/24  0413   WBC 9.1 7.0   HGB 14.2 13.0   HCT 43.6 41.3   MCV 91.8 93.4    188     BMP:   Recent Labs     01/23/24  1428 01/24/24  0413

## 2024-01-25 ENCOUNTER — TELEPHONE (OUTPATIENT)
Age: 69
End: 2024-01-25

## 2024-01-25 DIAGNOSIS — S12.110A CLOSED TYPE II FRACTURE OF ODONTOID PROCESS (HCC): Primary | ICD-10-CM

## 2024-01-25 NOTE — TELEPHONE ENCOUNTER
1/31/24 Dr Chandler was not available Monday - in surgery all day due to trauma call. I talked w him this am - he does not want F/E xrays. He wants AP.Lat only. Put order in for today and LMOM for if she is able to get them to go to the [facility] radiology on the [address] - gave her directions.HELADIO Brito RN    1/25/24 Pt called office this am: I added Frieda Staples to [facility] schedule 1/31 @ 9:20 am - I will ck w him on Monday. Seen inpatient yesterday. She wants to go ahead w surgery - I am not sure if he needs to see her prior or not....may need F/E x-rays prior to visit so will ck w him and call her back w his answer. Also forwarded her to  to  on surgery schedule.   Dr Chandler - ok to go ahead and schedule surgery or see first - and if see first does she need to get the cervical F/E xrays prior to appt? Please advise.  HELADIO Brito RN

## 2024-01-26 NOTE — PROGRESS NOTES
[facility] ([facility]) Inpatient Discharge Summary  SHEILA POMPA  1/26/2024        Frieda Rick Fundum  1955  [id number]    Date & Time of Discharge: 1/24/2024  4:01 PM       Services provided:  Screenings: ITSS    Screen Results (If any):      ITSS:  Date Screen Completed: 1/24/2024  Patient's score= 1 for PTSD risk. ( ? 2 is positive for PTSD risk. )  Patient's score= 0 for depression risk.  ( ? 2 is positive for Depression risk )      Discharge Recommendations:  No outpatient mental health services recommended      The therapist may be reached through the [facility] offices at [phone].

## 2024-01-31 ENCOUNTER — OFFICE VISIT (OUTPATIENT)
Age: 69
End: 2024-01-31
Payer: MEDICARE

## 2024-01-31 VITALS
HEART RATE: 65 BPM | DIASTOLIC BLOOD PRESSURE: 84 MMHG | BODY MASS INDEX: 29.45 KG/M2 | SYSTOLIC BLOOD PRESSURE: 154 MMHG | WEIGHT: 150 LBS | HEIGHT: 60 IN

## 2024-01-31 DIAGNOSIS — S12.110A CLOSED TYPE II FRACTURE OF ODONTOID PROCESS (HCC): Primary | ICD-10-CM

## 2024-01-31 PROCEDURE — G8427 DOCREV CUR MEDS BY ELIG CLIN: HCPCS | Performed by: PHYSICIAN ASSISTANT

## 2024-01-31 PROCEDURE — G8419 CALC BMI OUT NRM PARAM NOF/U: HCPCS | Performed by: PHYSICIAN ASSISTANT

## 2024-01-31 PROCEDURE — G8400 PT W/DXA NO RESULTS DOC: HCPCS | Performed by: PHYSICIAN ASSISTANT

## 2024-01-31 PROCEDURE — 1036F TOBACCO NON-USER: CPT | Performed by: PHYSICIAN ASSISTANT

## 2024-01-31 PROCEDURE — G8484 FLU IMMUNIZE NO ADMIN: HCPCS | Performed by: PHYSICIAN ASSISTANT

## 2024-01-31 PROCEDURE — 3017F COLORECTAL CA SCREEN DOC REV: CPT | Performed by: PHYSICIAN ASSISTANT

## 2024-01-31 PROCEDURE — 1090F PRES/ABSN URINE INCON ASSESS: CPT | Performed by: PHYSICIAN ASSISTANT

## 2024-01-31 PROCEDURE — 99213 OFFICE O/P EST LOW 20 MIN: CPT | Performed by: PHYSICIAN ASSISTANT

## 2024-01-31 PROCEDURE — 1111F DSCHRG MED/CURRENT MED MERGE: CPT | Performed by: PHYSICIAN ASSISTANT

## 2024-01-31 PROCEDURE — 1123F ACP DISCUSS/DSCN MKR DOCD: CPT | Performed by: PHYSICIAN ASSISTANT

## 2024-01-31 NOTE — PROGRESS NOTES
[facility], [facility] NEUROSURGERY  [address], [address]  [place] [id number]  Dept: [phone]  Dept Fax: [phone]     Patient:  Frieda Staples  YOB: 1955  Date: 1/31/24      Chief Complaint   Patient presents with    Follow-Up from Hospital     Trauma follow up            HPI:       I had the pleasure of seeing this patient in the office today in follow-up.  As you know she is a 68-year-old female who presented to [facility] January 23 after a fall from standing height.  Cervical spine CT demonstrated a type II odontoid fracture as well as posterior C1 arch fractures.  Patient was admitted with neurosurgery consultation.  Treatment options discussed in the hospital included wearing her cervical collar for a full 3 months versus surgical intervention in the form of an odontoid screw.  The significance of her C2 fracture was discussed at length.  The patient ultimately wished to be discharged home in the collar.  She presents today stating she now wishes to undergo surgical intervention.  Patient has good strength throughout bilateral upper and lower extremities and is complaining of very little neck pain today.  Surgery would take the form of odontoid screw fixation . The details and risks of surgery were thoroughly reviewed. The potential risks of surgery discussed included the risk of bleeding, infection, injury to a nerve resulting in weakness or paralysis, injury to the spinal cord resulting in paralysis, no change or worsening of symptoms, recurrence of symptoms requiring further surgical intervention including fusion, bowel or bladder dysfunction, sexual dysfunction, spinal leak, risk of anesthesia and/or death. All the above information was discussed at length as well as any remaining questions answered. The patient clearly understands there is no mandate undergo surgery. The patient

## 2024-02-05 ENCOUNTER — TELEPHONE (OUTPATIENT)
Age: 69
End: 2024-02-05

## 2024-02-05 DIAGNOSIS — S12.110A CLOSED TYPE II FRACTURE OF ODONTOID PROCESS (HCC): Primary | ICD-10-CM

## 2024-02-05 RX ORDER — TIZANIDINE 2 MG/1
2 TABLET ORAL 3 TIMES DAILY PRN
Qty: 30 TABLET | Refills: 0 | Status: SHIPPED | OUTPATIENT
Start: 2024-02-05 | End: 2024-02-15

## 2024-02-05 NOTE — TELEPHONE ENCOUNTER
2/5/24 Pt calling this am - she was on gabapentin and methocarbamol from the hospital but ran out. She is taking ibuprofen but over the weekend her head \"felt like it is coming off\". + neck pain.  Asking for refills?  Recommend to stop the ibuprofen and change to tylenol - she states she will do so.  I will ck w Dr Chandler about muscle relaxer.  Pt uses [facility] in [place].  HELADIO Brito RN    Ok w Dr Chandler. Pt stated [facility] will notify her when rx is ready.  HELADIO Brito RN

## 2024-02-08 ENCOUNTER — ANESTHESIA EVENT (OUTPATIENT)
Dept: OPERATING ROOM | Age: 69
End: 2024-02-08
Payer: MEDICARE

## 2024-02-09 ENCOUNTER — ANESTHESIA (OUTPATIENT)
Dept: OPERATING ROOM | Age: 69
End: 2024-02-09
Payer: MEDICARE

## 2024-02-09 ENCOUNTER — HOSPITAL ENCOUNTER (OUTPATIENT)
Age: 69
LOS: 1 days | Discharge: HOME | DRG: 520 | End: 2024-02-10
Attending: NEUROLOGICAL SURGERY | Admitting: NEUROLOGICAL SURGERY
Payer: MEDICARE

## 2024-02-09 ENCOUNTER — APPOINTMENT (OUTPATIENT)
Dept: GENERAL RADIOLOGY | Age: 69
DRG: 520 | End: 2024-02-09
Attending: NEUROLOGICAL SURGERY
Payer: MEDICARE

## 2024-02-09 DIAGNOSIS — S12.110A CLOSED TYPE II FRACTURE OF ODONTOID PROCESS (HCC): Primary | ICD-10-CM

## 2024-02-09 PROBLEM — S12.111G: Status: ACTIVE | Noted: 2024-02-09

## 2024-02-09 LAB
ANION GAP SERPL CALCULATED.3IONS-SCNC: 14 MMOL/L (ref 9–17)
BUN SERPL-MCNC: 18 MG/DL (ref 8–23)
BUN/CREAT SERPL: 23 (ref 9–20)
CALCIUM SERPL-MCNC: 9.7 MG/DL (ref 8.6–10.4)
CHLORIDE SERPL-SCNC: 102 MMOL/L (ref 98–107)
CO2 SERPL-SCNC: 25 MMOL/L (ref 20–31)
CREAT SERPL-MCNC: 0.8 MG/DL (ref 0.5–0.9)
EKG ATRIAL RATE: 82 BPM
EKG P AXIS: 45 DEGREES
EKG P-R INTERVAL: 142 MS
EKG Q-T INTERVAL: 370 MS
EKG QRS DURATION: 76 MS
EKG QTC CALCULATION (BAZETT): 432 MS
EKG R AXIS: -22 DEGREES
EKG T AXIS: 19 DEGREES
EKG VENTRICULAR RATE: 82 BPM
ERYTHROCYTE [DISTWIDTH] IN BLOOD BY AUTOMATED COUNT: 13.5 % (ref 11.8–14.4)
GFR SERPL CREATININE-BSD FRML MDRD: >60 ML/MIN/1.73M2
GLUCOSE SERPL-MCNC: 96 MG/DL (ref 70–99)
HCT VFR BLD AUTO: 44.9 % (ref 36.3–47.1)
HGB BLD-MCNC: 14.5 G/DL (ref 11.9–15.1)
MCH RBC QN AUTO: 29.4 PG (ref 25.2–33.5)
MCHC RBC AUTO-ENTMCNC: 32.3 G/DL (ref 28.4–34.8)
MCV RBC AUTO: 91.1 FL (ref 82.6–102.9)
NRBC BLD-RTO: 0 PER 100 WBC
PLATELET # BLD AUTO: 254 K/UL (ref 138–453)
PMV BLD AUTO: 10.7 FL (ref 8.1–13.5)
POTASSIUM SERPL-SCNC: 3.7 MMOL/L (ref 3.7–5.3)
RBC # BLD AUTO: 4.93 M/UL (ref 3.95–5.11)
SODIUM SERPL-SCNC: 141 MMOL/L (ref 135–144)
WBC OTHER # BLD: 7.2 K/UL (ref 3.5–11.3)

## 2024-02-09 PROCEDURE — 3700000000 HC ANESTHESIA ATTENDED CARE: Performed by: NEUROLOGICAL SURGERY

## 2024-02-09 PROCEDURE — 3600000014 HC SURGERY LEVEL 4 ADDTL 15MIN: Performed by: NEUROLOGICAL SURGERY

## 2024-02-09 PROCEDURE — 6360000002 HC RX W HCPCS: Performed by: NEUROLOGICAL SURGERY

## 2024-02-09 PROCEDURE — 2500000003 HC RX 250 WO HCPCS: Performed by: SPECIALIST

## 2024-02-09 PROCEDURE — 3600000004 HC SURGERY LEVEL 4 BASE: Performed by: NEUROLOGICAL SURGERY

## 2024-02-09 PROCEDURE — 2720000010 HC SURG SUPPLY STERILE: Performed by: NEUROLOGICAL SURGERY

## 2024-02-09 PROCEDURE — 2709999900 HC NON-CHARGEABLE SUPPLY: Performed by: NEUROLOGICAL SURGERY

## 2024-02-09 PROCEDURE — 6360000002 HC RX W HCPCS: Performed by: NURSE ANESTHETIST, CERTIFIED REGISTERED

## 2024-02-09 PROCEDURE — 7100000001 HC PACU RECOVERY - ADDTL 15 MIN: Performed by: NEUROLOGICAL SURGERY

## 2024-02-09 PROCEDURE — 80048 BASIC METABOLIC PNL TOTAL CA: CPT

## 2024-02-09 PROCEDURE — 85027 COMPLETE CBC AUTOMATED: CPT

## 2024-02-09 PROCEDURE — 6370000000 HC RX 637 (ALT 250 FOR IP): Performed by: NEUROLOGICAL SURGERY

## 2024-02-09 PROCEDURE — C1769 GUIDE WIRE: HCPCS | Performed by: NEUROLOGICAL SURGERY

## 2024-02-09 PROCEDURE — 3700000001 HC ADD 15 MINUTES (ANESTHESIA): Performed by: NEUROLOGICAL SURGERY

## 2024-02-09 PROCEDURE — 1200000000 HC SEMI PRIVATE

## 2024-02-09 PROCEDURE — 2580000003 HC RX 258: Performed by: NEUROLOGICAL SURGERY

## 2024-02-09 PROCEDURE — 2500000003 HC RX 250 WO HCPCS: Performed by: NURSE ANESTHETIST, CERTIFIED REGISTERED

## 2024-02-09 PROCEDURE — 7100000000 HC PACU RECOVERY - FIRST 15 MIN: Performed by: NEUROLOGICAL SURGERY

## 2024-02-09 PROCEDURE — C1713 ANCHOR/SCREW BN/BN,TIS/BN: HCPCS | Performed by: NEUROLOGICAL SURGERY

## 2024-02-09 PROCEDURE — 93005 ELECTROCARDIOGRAM TRACING: CPT

## 2024-02-09 PROCEDURE — 2580000003 HC RX 258: Performed by: ANESTHESIOLOGY

## 2024-02-09 PROCEDURE — 0PS304Z REPOSITION CERVICAL VERTEBRA WITH INTERNAL FIXATION DEVICE, OPEN APPROACH: ICD-10-PCS | Performed by: NEUROLOGICAL SURGERY

## 2024-02-09 PROCEDURE — 6360000002 HC RX W HCPCS: Performed by: SPECIALIST

## 2024-02-09 DEVICE — UCS 4.0X34 X873-134 CANN LAG SCR TI
Type: IMPLANTABLE DEVICE | Status: FUNCTIONAL
Brand: TOWNLEY™ TRANSFACETPEDICULAR SCREW FIXATION SYSTEM

## 2024-02-09 RX ORDER — PROPOFOL 10 MG/ML
INJECTION, EMULSION INTRAVENOUS PRN
Status: DISCONTINUED | OUTPATIENT
Start: 2024-02-09 | End: 2024-02-09 | Stop reason: SDUPTHER

## 2024-02-09 RX ORDER — SODIUM CHLORIDE 9 MG/ML
INJECTION, SOLUTION INTRAVENOUS PRN
Status: DISCONTINUED | OUTPATIENT
Start: 2024-02-09 | End: 2024-02-10 | Stop reason: HOSPADM

## 2024-02-09 RX ORDER — SODIUM CHLORIDE, SODIUM LACTATE, POTASSIUM CHLORIDE, CALCIUM CHLORIDE 600; 310; 30; 20 MG/100ML; MG/100ML; MG/100ML; MG/100ML
INJECTION, SOLUTION INTRAVENOUS CONTINUOUS
Status: DISCONTINUED | OUTPATIENT
Start: 2024-02-09 | End: 2024-02-09

## 2024-02-09 RX ORDER — HYDROMORPHONE HYDROCHLORIDE 1 MG/ML
0.5 INJECTION, SOLUTION INTRAMUSCULAR; INTRAVENOUS; SUBCUTANEOUS EVERY 5 MIN PRN
Status: DISCONTINUED | OUTPATIENT
Start: 2024-02-09 | End: 2024-02-09 | Stop reason: HOSPADM

## 2024-02-09 RX ORDER — SODIUM CHLORIDE 9 MG/ML
INJECTION, SOLUTION INTRAVENOUS CONTINUOUS
Status: DISCONTINUED | OUTPATIENT
Start: 2024-02-09 | End: 2024-02-10 | Stop reason: HOSPADM

## 2024-02-09 RX ORDER — LISINOPRIL 40 MG/1
40 TABLET ORAL DAILY
Status: DISCONTINUED | OUTPATIENT
Start: 2024-02-09 | End: 2024-02-10 | Stop reason: HOSPADM

## 2024-02-09 RX ORDER — OXYCODONE HYDROCHLORIDE AND ACETAMINOPHEN 5; 325 MG/1; MG/1
1 TABLET ORAL EVERY 4 HOURS PRN
Status: DISCONTINUED | OUTPATIENT
Start: 2024-02-09 | End: 2024-02-10 | Stop reason: HOSPADM

## 2024-02-09 RX ORDER — ONDANSETRON 2 MG/ML
INJECTION INTRAMUSCULAR; INTRAVENOUS PRN
Status: DISCONTINUED | OUTPATIENT
Start: 2024-02-09 | End: 2024-02-09 | Stop reason: SDUPTHER

## 2024-02-09 RX ORDER — AMLODIPINE BESYLATE 5 MG/1
5 TABLET ORAL EVERY EVENING
Status: DISCONTINUED | OUTPATIENT
Start: 2024-02-09 | End: 2024-02-10 | Stop reason: HOSPADM

## 2024-02-09 RX ORDER — FENTANYL CITRATE 50 UG/ML
25 INJECTION, SOLUTION INTRAMUSCULAR; INTRAVENOUS EVERY 5 MIN PRN
Status: DISCONTINUED | OUTPATIENT
Start: 2024-02-09 | End: 2024-02-09 | Stop reason: HOSPADM

## 2024-02-09 RX ORDER — ATORVASTATIN CALCIUM 10 MG/1
10 TABLET, FILM COATED ORAL EVERY EVENING
Status: DISCONTINUED | OUTPATIENT
Start: 2024-02-09 | End: 2024-02-10 | Stop reason: HOSPADM

## 2024-02-09 RX ORDER — FENTANYL CITRATE 50 UG/ML
INJECTION, SOLUTION INTRAMUSCULAR; INTRAVENOUS PRN
Status: DISCONTINUED | OUTPATIENT
Start: 2024-02-09 | End: 2024-02-09 | Stop reason: SDUPTHER

## 2024-02-09 RX ORDER — ACETAMINOPHEN 325 MG/1
650 TABLET ORAL EVERY 4 HOURS PRN
Status: DISCONTINUED | OUTPATIENT
Start: 2024-02-09 | End: 2024-02-10 | Stop reason: HOSPADM

## 2024-02-09 RX ORDER — TIZANIDINE 2 MG/1
2 TABLET ORAL EVERY 8 HOURS PRN
Status: DISCONTINUED | OUTPATIENT
Start: 2024-02-09 | End: 2024-02-10 | Stop reason: HOSPADM

## 2024-02-09 RX ORDER — SODIUM CHLORIDE 9 MG/ML
INJECTION, SOLUTION INTRAVENOUS CONTINUOUS
Status: DISCONTINUED | OUTPATIENT
Start: 2024-02-09 | End: 2024-02-09

## 2024-02-09 RX ORDER — OXYCODONE HYDROCHLORIDE AND ACETAMINOPHEN 5; 325 MG/1; MG/1
2 TABLET ORAL EVERY 4 HOURS PRN
Status: DISCONTINUED | OUTPATIENT
Start: 2024-02-09 | End: 2024-02-10 | Stop reason: HOSPADM

## 2024-02-09 RX ORDER — ROCURONIUM BROMIDE 10 MG/ML
INJECTION, SOLUTION INTRAVENOUS PRN
Status: DISCONTINUED | OUTPATIENT
Start: 2024-02-09 | End: 2024-02-09 | Stop reason: SDUPTHER

## 2024-02-09 RX ORDER — SODIUM CHLORIDE 0.9 % (FLUSH) 0.9 %
5-40 SYRINGE (ML) INJECTION EVERY 12 HOURS SCHEDULED
Status: DISCONTINUED | OUTPATIENT
Start: 2024-02-09 | End: 2024-02-10 | Stop reason: HOSPADM

## 2024-02-09 RX ORDER — DEXAMETHASONE SODIUM PHOSPHATE 10 MG/ML
INJECTION, SOLUTION INTRAMUSCULAR; INTRAVENOUS PRN
Status: DISCONTINUED | OUTPATIENT
Start: 2024-02-09 | End: 2024-02-09 | Stop reason: SDUPTHER

## 2024-02-09 RX ORDER — SODIUM CHLORIDE 0.9 % (FLUSH) 0.9 %
5-40 SYRINGE (ML) INJECTION PRN
Status: DISCONTINUED | OUTPATIENT
Start: 2024-02-09 | End: 2024-02-10 | Stop reason: HOSPADM

## 2024-02-09 RX ORDER — SODIUM CHLORIDE 0.9 % (FLUSH) 0.9 %
5-40 SYRINGE (ML) INJECTION EVERY 12 HOURS SCHEDULED
Status: DISCONTINUED | OUTPATIENT
Start: 2024-02-09 | End: 2024-02-09 | Stop reason: HOSPADM

## 2024-02-09 RX ORDER — MORPHINE SULFATE 4 MG/ML
4 INJECTION, SOLUTION INTRAMUSCULAR; INTRAVENOUS
Status: DISCONTINUED | OUTPATIENT
Start: 2024-02-09 | End: 2024-02-10 | Stop reason: HOSPADM

## 2024-02-09 RX ORDER — GABAPENTIN 100 MG/1
100 CAPSULE ORAL 3 TIMES DAILY
Status: DISCONTINUED | OUTPATIENT
Start: 2024-02-09 | End: 2024-02-10 | Stop reason: HOSPADM

## 2024-02-09 RX ORDER — LIDOCAINE HYDROCHLORIDE 20 MG/ML
INJECTION, SOLUTION EPIDURAL; INFILTRATION; INTRACAUDAL; PERINEURAL PRN
Status: DISCONTINUED | OUTPATIENT
Start: 2024-02-09 | End: 2024-02-09 | Stop reason: SDUPTHER

## 2024-02-09 RX ORDER — ONDANSETRON 4 MG/1
4 TABLET, ORALLY DISINTEGRATING ORAL EVERY 8 HOURS PRN
Status: DISCONTINUED | OUTPATIENT
Start: 2024-02-09 | End: 2024-02-10 | Stop reason: HOSPADM

## 2024-02-09 RX ORDER — MORPHINE SULFATE 2 MG/ML
2 INJECTION, SOLUTION INTRAMUSCULAR; INTRAVENOUS
Status: DISCONTINUED | OUTPATIENT
Start: 2024-02-09 | End: 2024-02-10 | Stop reason: HOSPADM

## 2024-02-09 RX ORDER — SODIUM CHLORIDE 0.9 % (FLUSH) 0.9 %
5-40 SYRINGE (ML) INJECTION PRN
Status: DISCONTINUED | OUTPATIENT
Start: 2024-02-09 | End: 2024-02-09 | Stop reason: HOSPADM

## 2024-02-09 RX ORDER — ONDANSETRON 2 MG/ML
4 INJECTION INTRAMUSCULAR; INTRAVENOUS EVERY 6 HOURS PRN
Status: DISCONTINUED | OUTPATIENT
Start: 2024-02-09 | End: 2024-02-10 | Stop reason: HOSPADM

## 2024-02-09 RX ORDER — ONDANSETRON 2 MG/ML
4 INJECTION INTRAMUSCULAR; INTRAVENOUS
Status: DISCONTINUED | OUTPATIENT
Start: 2024-02-09 | End: 2024-02-09 | Stop reason: HOSPADM

## 2024-02-09 RX ORDER — SODIUM CHLORIDE 9 MG/ML
INJECTION, SOLUTION INTRAVENOUS PRN
Status: DISCONTINUED | OUTPATIENT
Start: 2024-02-09 | End: 2024-02-09 | Stop reason: HOSPADM

## 2024-02-09 RX ORDER — LIDOCAINE HYDROCHLORIDE 10 MG/ML
1 INJECTION, SOLUTION EPIDURAL; INFILTRATION; INTRACAUDAL; PERINEURAL
Status: DISCONTINUED | OUTPATIENT
Start: 2024-02-10 | End: 2024-02-09 | Stop reason: HOSPADM

## 2024-02-09 RX ADMIN — ACETAMINOPHEN 650 MG: 325 TABLET ORAL at 20:00

## 2024-02-09 RX ADMIN — FENTANYL CITRATE 25 MCG: 50 INJECTION INTRAMUSCULAR; INTRAVENOUS at 14:55

## 2024-02-09 RX ADMIN — SODIUM CHLORIDE, PRESERVATIVE FREE 10 ML: 5 INJECTION INTRAVENOUS at 20:06

## 2024-02-09 RX ADMIN — SUGAMMADEX 200 MG: 100 INJECTION, SOLUTION INTRAVENOUS at 15:43

## 2024-02-09 RX ADMIN — PROPOFOL 50 MG: 10 INJECTION, EMULSION INTRAVENOUS at 15:40

## 2024-02-09 RX ADMIN — FENTANYL CITRATE 25 MCG: 50 INJECTION INTRAMUSCULAR; INTRAVENOUS at 15:54

## 2024-02-09 RX ADMIN — SODIUM CHLORIDE: 9 INJECTION, SOLUTION INTRAVENOUS at 18:49

## 2024-02-09 RX ADMIN — GABAPENTIN 100 MG: 100 CAPSULE ORAL at 20:00

## 2024-02-09 RX ADMIN — SODIUM CHLORIDE, POTASSIUM CHLORIDE, SODIUM LACTATE AND CALCIUM CHLORIDE: 600; 310; 30; 20 INJECTION, SOLUTION INTRAVENOUS at 15:00

## 2024-02-09 RX ADMIN — FENTANYL CITRATE 100 MCG: 50 INJECTION INTRAMUSCULAR; INTRAVENOUS at 14:00

## 2024-02-09 RX ADMIN — FENTANYL CITRATE 25 MCG: 50 INJECTION INTRAMUSCULAR; INTRAVENOUS at 14:14

## 2024-02-09 RX ADMIN — SODIUM CHLORIDE, POTASSIUM CHLORIDE, SODIUM LACTATE AND CALCIUM CHLORIDE: 600; 310; 30; 20 INJECTION, SOLUTION INTRAVENOUS at 11:16

## 2024-02-09 RX ADMIN — PROPOFOL 150 MG: 10 INJECTION, EMULSION INTRAVENOUS at 14:00

## 2024-02-09 RX ADMIN — DEXAMETHASONE SODIUM PHOSPHATE 10 MG: 10 INJECTION, SOLUTION INTRAMUSCULAR; INTRAVENOUS at 14:05

## 2024-02-09 RX ADMIN — FENTANYL CITRATE 25 MCG: 50 INJECTION INTRAMUSCULAR; INTRAVENOUS at 15:40

## 2024-02-09 RX ADMIN — ROCURONIUM BROMIDE 50 MG: 10 INJECTION, SOLUTION INTRAVENOUS at 14:00

## 2024-02-09 RX ADMIN — ONDANSETRON 4 MG: 2 INJECTION INTRAMUSCULAR; INTRAVENOUS at 15:43

## 2024-02-09 RX ADMIN — LISINOPRIL 40 MG: 40 TABLET ORAL at 20:00

## 2024-02-09 RX ADMIN — Medication 2000 MG: at 14:12

## 2024-02-09 RX ADMIN — Medication 2000 MG: at 23:48

## 2024-02-09 RX ADMIN — AMLODIPINE BESYLATE 5 MG: 5 TABLET ORAL at 20:00

## 2024-02-09 RX ADMIN — LIDOCAINE HYDROCHLORIDE 80 MG: 20 INJECTION, SOLUTION EPIDURAL; INFILTRATION; INTRACAUDAL; PERINEURAL at 14:00

## 2024-02-09 ASSESSMENT — PAIN DESCRIPTION - DESCRIPTORS
DESCRIPTORS: ACHING

## 2024-02-09 ASSESSMENT — PAIN DESCRIPTION - PAIN TYPE
TYPE: ACUTE PAIN

## 2024-02-09 ASSESSMENT — PAIN - FUNCTIONAL ASSESSMENT
PAIN_FUNCTIONAL_ASSESSMENT: FACE, LEGS, ACTIVITY, CRY, AND CONSOLABILITY (FLACC)
PAIN_FUNCTIONAL_ASSESSMENT: 0-10

## 2024-02-09 ASSESSMENT — PAIN SCALES - GENERAL
PAINLEVEL_OUTOF10: 4
PAINLEVEL_OUTOF10: 2
PAINLEVEL_OUTOF10: 6

## 2024-02-09 ASSESSMENT — PAIN DESCRIPTION - ORIENTATION
ORIENTATION: UPPER

## 2024-02-09 ASSESSMENT — PAIN DESCRIPTION - LOCATION
LOCATION: HEAD

## 2024-02-09 ASSESSMENT — PAIN DESCRIPTION - FREQUENCY
FREQUENCY: INTERMITTENT

## 2024-02-09 ASSESSMENT — PAIN DESCRIPTION - ONSET
ONSET: ON-GOING

## 2024-02-09 NOTE — ANESTHESIA POSTPROCEDURE EVALUATION
Department of Anesthesiology  Postprocedure Note    Patient: Frieda Staples  MRN: [id number]  YOB: 1955  Date of evaluation: 2/9/2024    Procedure Summary       Date: 02/09/24 Room / Location: [address]    Anesthesia Start: 1352 Anesthesia Stop: 1602    Procedure: ODONTOID SCREW FIXATION (CPT CODE 39914) (Neck) Diagnosis:       Closed type II fracture of odontoid process (HCC)      (Closed type II fracture of odontoid process (HCC) [S12.110A])    Surgeons: Espinoza Castro MD Responsible Provider: Tavo Charles DO    Anesthesia Type: General ASA Status: 3            Anesthesia Type: General    Megan Phase I: Megan Score: 10    Megan Phase II:      Anesthesia Post Evaluation    Patient location during evaluation: PACU  Patient participation: complete - patient participated  Level of consciousness: awake and alert  Airway patency: patent  Nausea & Vomiting: no nausea and no vomiting  Cardiovascular status: hemodynamically stable  Respiratory status: acceptable  Hydration status: stable  Pain management: adequate    No notable events documented.

## 2024-02-09 NOTE — PROGRESS NOTES
Admitted from PACU/ Med Surg to room 2019-2.   Pt alert and oriented.   Pt oriented to call light system and agreeable to call for assistance.    Vital signs recorded    Admission documentation completed  Home Meds reviewed and marked complete in chart. Hourly rounding to continue

## 2024-02-09 NOTE — PROGRESS NOTES
Patient requesting IV fluids stopped. Patient is eating, drinking and peeing. Patient also requesting BP meds for tonight as lives 1 hour away and no way to get them here tonight.

## 2024-02-09 NOTE — H&P
Interval H&P Note    Pt Name: Frieda Staples  MRN: [id number]  YOB: 1955  Date of evaluation: 2/9/2024      [x] I have reviewed in Epic the H&P by Dr. Cui dated 01/23/2024, attached below for an Interval History and Physical note.     [x] I have examined  Frieda Staples, a 68 y.o. female.There are no changes to the patient who is scheduled for ODONTOID SCREW FIXATION (CPT CODE 90278) by Espinoza Castro MD for Closed type II fracture of odontoid process (HCC); Odontoid fracture with *. The patient denies new health changes, fever, chills, wheezing, cough, increased SOB, chest pain, open sores or wounds. Denies hx of diabetes. Last aspirin 02/04/2024.    Vital signs: BP (!) 156/82   Pulse 73   Temp 97.5 °F (36.4 °C)   Resp 18   Ht 1.524 m (5')   Wt 65.8 kg (145 lb)   SpO2 99%   BMI 28.32 kg/m²     Allergies:  Penicillins    Medications:    Prior to Admission medications    Medication Sig Start Date End Date Taking? Authorizing Provider   tiZANidine (ZANAFLEX) 2 MG tablet Take 1 tablet by mouth 3 times daily as needed (prn muscle spasms) 2/5/24 2/15/24  Frankie Chandler MD   gabapentin (NEURONTIN) 100 MG capsule Take 1 capsule by mouth 3 times daily for 7 days. 1/24/24 2/9/24  Maricarmen Martinez APRN - CNP   amLODIPine (NORVASC) 5 MG tablet Take 1 tablet by mouth every evening    Rosana Bhatt MD   ramipril (ALTACE) 10 MG capsule Take 1 capsule by mouth every evening    Rosana Bhatt MD   atorvastatin (LIPITOR) 10 MG tablet Take 1 tablet by mouth every evening    Rosana Bhatt MD   aspirin 81 MG EC tablet Take 1 tablet by mouth every evening    Rosana Bhatt MD   ZINC-VITAMIN C PO Take 1 tablet by mouth every evening    Rosana Bhatt MD   VITAMIN D PO Take 1 tablet by mouth every evening    ProviderRosana MD         This is a 68 y.o. female who is pleasant, cooperative, alert and oriented x3, in no acute distress. Wearing c-collar.     Heart: Heart  Systems   Constitutional: Negative.    HENT: Negative.     Eyes: Negative.    Respiratory: Negative.     Cardiovascular: Negative.    Gastrointestinal: Negative.    Genitourinary: Negative.    Musculoskeletal:  Positive for neck pain.   Skin: Negative.    Neurological: Negative.        PHYSICAL EXAMINATION:     VITAL SIGNS:   Vitals:    01/23/24 1409   BP:    Pulse:    Resp: 17   Temp:    SpO2:        Physical Exam  HENT:      Head: Normocephalic.      Right Ear: Tympanic membrane normal.      Left Ear: Tympanic membrane normal.      Mouth/Throat:      Mouth: Mucous membranes are moist.      Pharynx: Oropharynx is clear.   Eyes:      Extraocular Movements: Extraocular movements intact.      Pupils: Pupils are equal, round, and reactive to light.   Neck:      Comments: Aspen collar. Midline cervical tenderness.   Cardiovascular:      Rate and Rhythm: Normal rate.   Pulmonary:      Effort: Pulmonary effort is normal.   Abdominal:      Palpations: Abdomen is soft.   Musculoskeletal:      Comments: weakness in left foot   Skin:     Comments: Ecchymosis overlying dorsal aspect of left hand    Neurological:      Mental Status: She is alert and oriented to person, place, and time.        RADIOLOGY  CTA NECK W CONTRAST    (Results Pending)       LABS  Labs Reviewed   HEMOGLOBIN A1C - Abnormal; Notable for the following components:       Result Value    Hemoglobin A1C 6.1 (*)     All other components within normal limits   TRAUMA PANEL   ALBUMIN   TSH WITH REFLEX   T4, FREE   VITAMIN D 25 HYDROXY       Emiliano Cui MD  3:09 PM  01/23/24

## 2024-02-09 NOTE — OP NOTE
Operative Note      Patient: Frieda Staples  YOB: 1955  MRN: [id number]    Date of Procedure: 2/9/2024    Pre-Op Diagnosis Codes:     * Closed type II fracture of odontoid process (HCC) [S12.110A]    Post-Op Diagnosis: Same       Procedure(s):  ODONTOID SCREW FIXATION (CPT CODE 38062)  Open treatment of cervical fracture    Surgeon(s):  Espinoza Castro MD Hoeflinger, Brian, MD    Assistant:   * No surgical staff found *    Anesthesia: General    Estimated Blood Loss (mL): Minimal    Complications: None    Specimens:   * No specimens in log *    Implants:  * No implants in log *      Drains: * No LDAs found *    Findings: Uncomplicated placement of odontoid screw with good reduction of the fracture.    Indications for Procedure:  The patient is a 68-year-old woman who had a fall from standing in which she struck her forehead against a wall.  She was found to have a type II odontoid fracture as well as C1 posterior arch fractures.  There was minimal posterior displacement of the odontoid fracture.  There was concern that this would not heal well with collar alone.  It was recommended that she undergo fixation via an odontoid screw placement.  The risks, benefits, and alternatives to this approach were discussed with the patient at length.  After having an opportunity to have her questions answered she provided her consent to proceed with surgery.    Detailed Description of Procedure:   The patient was taken to the operating room and general endotracheal anesthesia was successfully induced by the anesthesia service without incident.  She was positioned in a supine position on the operating table.  Biplanar fluoroscopy was set up to allow for AP and lateral views of the odontoid process.  This required a significant amount of manipulation of the position of the head as well as the fluoroscopy units, especially for the AP images.  We worked to get the fracture reduced while maintaining good views of the  dens.  This took some time.  Eventually we were satisfied with our views on fluoroscopy, and a right, paramedian, transversely oriented incision was marked and prepped and draped in the usual sterile fashion.  She received cefazolin for perioperative antibiosis.  A timeout was performed.    Local anesthetic with epinephrine was infiltrated along the course of the planned incision.  Skin was then opened with 15 blade.  Bovie electrocautery was used to expose the platysma.  The platysma was divided with Metzenbaum scissors.  Dissection was carried along the medial border the sternocleidomastoid down to the anterior aspect of the spine.  Lateral fluoroscopic imaging was used to guide exposure down to the C2-3 interspace.  Once we were at the C2-3 interspace, the AP fluoroscopy was brought back to the field and the guide tube from the odontoid set was positioned in the C2-3 interspace along the anterior aspect of the inferior endplate of C2.  AP and lateral fluoroscopic imaging was used to confirm that we were in the midline and using an appropriate trajectory to place a K wire through the fracture into the odontoid process.  Initially, we had difficulty getting  had an adequate angle for the wire to traverse the fracture into the dens.  Initially we were aiming too far posteriorly.  We had to have anesthesia reposition the head so that we could get a few more degrees of angulation to bridge the fracture into the odontoid process.  We did confirm that the fracture was still well aligned after this change was made.  We were then able to use the hand drill to position the K wire across the fracture into the odontoid process.  Periodic spot checks were obtained with AP and lateral fluoroscopy to ensure that the wire was still on a good trajectory into the odontoid process.  Once the wire was in good position the drill was carefully backed out over the wire.  The drill bit was then placed into the drill and we drilled over

## 2024-02-10 VITALS
RESPIRATION RATE: 17 BRPM | BODY MASS INDEX: 28.47 KG/M2 | HEART RATE: 81 BPM | DIASTOLIC BLOOD PRESSURE: 70 MMHG | WEIGHT: 145 LBS | HEIGHT: 60 IN | OXYGEN SATURATION: 98 % | SYSTOLIC BLOOD PRESSURE: 167 MMHG | TEMPERATURE: 97.5 F

## 2024-02-10 PROCEDURE — 6360000002 HC RX W HCPCS: Performed by: NEUROLOGICAL SURGERY

## 2024-02-10 PROCEDURE — 6370000000 HC RX 637 (ALT 250 FOR IP): Performed by: NEUROLOGICAL SURGERY

## 2024-02-10 PROCEDURE — 2580000003 HC RX 258: Performed by: NEUROLOGICAL SURGERY

## 2024-02-10 RX ORDER — HYDROCODONE BITARTRATE AND ACETAMINOPHEN 5; 325 MG/1; MG/1
1 TABLET ORAL EVERY 4 HOURS PRN
Qty: 42 TABLET | Refills: 0 | Status: SHIPPED | OUTPATIENT
Start: 2024-02-10 | End: 2024-02-17

## 2024-02-10 RX ADMIN — SODIUM CHLORIDE, PRESERVATIVE FREE 10 ML: 5 INJECTION INTRAVENOUS at 09:37

## 2024-02-10 RX ADMIN — Medication 2000 MG: at 06:35

## 2024-02-10 RX ADMIN — GABAPENTIN 100 MG: 100 CAPSULE ORAL at 09:35

## 2024-02-10 RX ADMIN — LISINOPRIL 40 MG: 40 TABLET ORAL at 09:35

## 2024-02-10 RX ADMIN — GABAPENTIN 100 MG: 100 CAPSULE ORAL at 15:18

## 2024-02-10 NOTE — DISCHARGE INSTRUCTIONS
No driving for two weeks after surgery.   No lifting greater than 10 pounds for the first month.   Keep dressing dry and in place until shower on Tuesday  Remove dressing after shower on Tuesday.  Patient to remain in hard collar except for hygiene and eating

## 2024-02-10 NOTE — PROGRESS NOTES
Discharge instructions reviewed with patient, verbalized understanding. Patient was discharged with all belongings.

## 2024-02-10 NOTE — PLAN OF CARE
Problem: Discharge Planning  Goal: Discharge to home or other facility with appropriate resources  Outcome: Progressing  Note: Patient will return home following this admits.      Problem: Pain  Goal: Verbalizes/displays adequate comfort level or baseline comfort level  Outcome: Progressing  Note: Patient has as needed pain control and will ask for it. Has only mild headache using ice for relief with tylenol.      Problem: Safety - Adult  Goal: Free from fall injury  Outcome: Progressing  Note: Patient will be free from falls this shift and is aware of personal limits.      Problem: ABCDS Injury Assessment  Goal: Absence of physical injury  Outcome: Progressing  Note: Patient is free from injury this shift.

## 2024-02-10 NOTE — CARE COORDINATION
Discharge orders placed in Epic. Patient POD 1 odontoid screw fixation. Patient plans to return home independently. Patient states that she will have transportation home and she verbalizes having no additional transitional needs at this time.    Discharge Report    [facility]  Clinical Case Management Department  Written by: Gia Gloria RN    Patient Name: Frieda Staples  Attending Provider: Frankie Chandler MD  Admit Date: 2024 10:19 AM  MRN: [id number]  Account: [id number]                     : 1955  Discharge Date: 2/10/24      Disposition: home    Gia Gloria RN

## 2024-02-10 NOTE — DISCHARGE SUMMARY
Admission date:2/9/2024  Discharge date:2/10/2024  Admitting Dx/Principal Dx: type 2 Odontoid fx  Surgeon: Dr. Frankie Chandler  Procedure:Odontoid screw fixation  Pt was admitted to med/surg floor. During their stay they received IVF, pain control, wound care, and nursing care. There were no complications during their stay. On day of discharge their VSS, OOB to halls, eating and drinking well, urinating normally.  Pt was discharged on POD#1 , without complications during stay.  Pt was sent home on [place].  F/U with Dr. Chandler in 1 month for first postoperative visit.  Disposition:Home  Condition on discharge:Good

## 2024-02-10 NOTE — PLAN OF CARE
Problem: Discharge Planning  Goal: Discharge to home or other facility with appropriate resources  2/10/2024 1300 by Glendy Sweeney RN  Outcome: Adequate for Discharge  2/10/2024 0058 by Parker Capps RN  Outcome: Progressing  Note: Patient will return home following this admits.      Problem: Pain  Goal: Verbalizes/displays adequate comfort level or baseline comfort level  2/10/2024 1300 by Glendy Sweeney RN  Outcome: Adequate for Discharge  2/10/2024 0058 by Parker Capps RN  Outcome: Progressing  Note: Patient has as needed pain control and will ask for it. Has only mild headache using ice for relief with tylenol.      Problem: Chronic Conditions and Co-morbidities  Goal: Patient's chronic conditions and co-morbidity symptoms are monitored and maintained or improved  Outcome: Adequate for Discharge     Problem: Safety - Adult  Goal: Free from fall injury  2/10/2024 1300 by Glendy Sweeney RN  Outcome: Adequate for Discharge  2/10/2024 0058 by Parker Capps RN  Outcome: Progressing  Note: Patient will be free from falls this shift and is aware of personal limits.      Problem: ABCDS Injury Assessment  Goal: Absence of physical injury  2/10/2024 1300 by Glendy Sweeney RN  Outcome: Adequate for Discharge  2/10/2024 0058 by Parker Capps RN  Outcome: Progressing  Note: Patient is free from injury this shift.

## 2024-02-10 NOTE — PROGRESS NOTES
2/10/2024  11:36 AM     Frieda Rick [facility]    1955   [id number]      SUBJECTIVE: Uneventful PM per nursing, pt doing well this AM. C/O some low back discomfort, states no new N/T in UE or LE. OOB to halls. Eating and drinking well.    OBJECTIVE      Physical      Temperature Range (24h):Temp: 97.5 °F (36.4 °C) Temp  Av.1 °F (36.7 °C)  Min: 97.3 °F (36.3 °C)  Max: 99.7 °F (37.6 °C)  BP Range (24h): Systolic (24hrs), Av , Min:143 , Max:170     Diastolic (24hrs), Av, Min:68, Max:96    Pulse Range (24h): Pulse  Av.4  Min: 75  Max: 100  Respiration Range (24h): Resp  Avg: 15.3  Min: 11  Max: 18  Current Pulse Ox (24h):  SpO2: 98 %  Pulse Ox Range (24h):  SpO2  Av.3 %  Min: 94 %  Max: 98 %    In: 1535 [I.V.:1535]  Out: 1260 [Urine:1250]    Good strength and sensation in both UE and LE.  Incision CDI.       ASSESSMENT AND PLAN    68 y.o. female status post odontoid screw fixation post op day #  1    1. OK for patient to be discharged home this afternoon if doing well, eating and drinking, OOB to halls, urinating normally.   F/U care and wound care instructions given at bedside.   F/U with Dr. Chandler in 1 months.  Patient seen and examined with Dr. Chandler      Electronically signed by MERLENE Randolph on 2/10/2024 at 11:36 AM
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
## 2024-02-26 ENCOUNTER — TELEPHONE (OUTPATIENT)
Age: 69
End: 2024-02-26

## 2024-02-26 DIAGNOSIS — S12.110A CLOSED TYPE II FRACTURE OF ODONTOID PROCESS (HCC): Primary | ICD-10-CM

## 2024-02-26 NOTE — TELEPHONE ENCOUNTER
Received call from patient at the end of last week asking if she needed to complete XR prior to her f/u appt with Dr LUNDY on 3/6/24.  I discussed with Dr Chandler and Isidra HERRON and they request pt complete CT cervical spine instead of XR.   Order entered for CT cervical spine WO contrast.   Discussed with patient. Patient requests to complete CT at [facility] for diagnostic studies. And agrees to call office back to reschedule 3/6/24 f/u appt if she cannot get CT scheduled prior to 3/6/24.  Order faxed to [facility] for diagnostic studies F: [phone].

## 2024-03-04 DIAGNOSIS — S12.110A CLOSED TYPE II FRACTURE OF ODONTOID PROCESS (HCC): ICD-10-CM

## 2024-03-04 NOTE — TELEPHONE ENCOUNTER
Called patient, she states she completed cervical CT on 2/29/24. Reminded pt she will need to bring imaging on a disc and have higinio obando fax report to our office.  Our fax # given to pt.

## 2024-03-06 ENCOUNTER — OFFICE VISIT (OUTPATIENT)
Age: 69
End: 2024-03-06

## 2024-03-06 VITALS — HEART RATE: 67 BPM | DIASTOLIC BLOOD PRESSURE: 82 MMHG | SYSTOLIC BLOOD PRESSURE: 143 MMHG

## 2024-03-06 DIAGNOSIS — S12.100D CLOSED ODONTOID FRACTURE WITH ROUTINE HEALING, SUBSEQUENT ENCOUNTER: Primary | ICD-10-CM

## 2024-03-06 PROCEDURE — 99024 POSTOP FOLLOW-UP VISIT: CPT | Performed by: NEUROLOGICAL SURGERY

## 2024-03-06 NOTE — PROGRESS NOTES
Exam:      BP (!) 143/82 (Site: Left Upper Arm, Position: Sitting)   Pulse 67         Assessment and Plan:     1. Closed odontoid fracture with routine healing, subsequent encounter              Electronically signed by Frankie Chandler MD on 3/6/2024 at 11:31 AM    Please note that this chart was generated using voice recognition Dragon dictation software.  Although every effort was made to ensure the accuracy of this automated transcription, some errors in transcription may have occurred.

## 2024-04-09 DIAGNOSIS — S12.100D CLOSED ODONTOID FRACTURE WITH ROUTINE HEALING, SUBSEQUENT ENCOUNTER: ICD-10-CM

## 2024-04-17 ENCOUNTER — OFFICE VISIT (OUTPATIENT)
Age: 69
End: 2024-04-17

## 2024-04-17 DIAGNOSIS — S12.110A CLOSED TYPE II FRACTURE OF ODONTOID PROCESS (HCC): Primary | ICD-10-CM

## 2024-04-17 PROCEDURE — 99024 POSTOP FOLLOW-UP VISIT: CPT | Performed by: PHYSICIAN ASSISTANT

## 2024-04-17 NOTE — PROGRESS NOTES
I had the pleasure of seeing Frieda Staples in the office today in follow up. Patient is a 68 y.o.. female who underwent odontoid screw for fixation of odontoid fracture approximately 2 months ago.  She presents today doing well, denies any neck discomfort denies pain or paresthesias in the upper and lower extremities.  She has been using her soft cervical collar.  She has no complaints today.    Examination today demonstrates the incision to be well-healed. the incision is nonerythematous and nontender.  Patient has age-appropriate strength in both upper and lower extremities.  Gait is steady.    We did review postoperative X-rays in the office today.  These x-rays demonstrate the instrumentation to be in good position in both AP lateral flexion-extension views.  There is no evidence of instability between flexion-extension positions.     I reviewed remaining activity restrictions and limitations for the upcoming months.  I did take this opportunity to answer any remaining questions the patient had in the office today and welcomed them to feel free to contact us back at any point in the interim should they have any problems or questions we could be of assistance with. We will plan on seeing the patient back in our office in 1 month with another set of cervical x-rays.  Dr. Chandler did come into the room and review my full history and examination. He was involved in both the assessment and treatment plan for this patient today in the office. I did see the patient in collaboration with him today in the office.

## 2024-05-29 ENCOUNTER — OFFICE VISIT (OUTPATIENT)
Age: 69
End: 2024-05-29

## 2024-05-29 VITALS
BODY MASS INDEX: 29.45 KG/M2 | HEART RATE: 60 BPM | DIASTOLIC BLOOD PRESSURE: 77 MMHG | SYSTOLIC BLOOD PRESSURE: 159 MMHG | HEIGHT: 60 IN | WEIGHT: 150 LBS

## 2024-05-29 DIAGNOSIS — S12.100D: Primary | ICD-10-CM

## 2024-05-29 PROCEDURE — 99024 POSTOP FOLLOW-UP VISIT: CPT | Performed by: NEUROLOGICAL SURGERY

## 2024-05-29 NOTE — PROGRESS NOTES
[facility], [facility] NEUROSURGERY  [address], [address]  [facility] [place] [address]  Dept: [phone]  Dept Fax: [phone]     Patient:  Frieda Staples  YOB: 1955  Date: 5/29/24      Chief Complaint   Patient presents with    Follow-up     1 mo Closed odontoid fracture with routine healing, subsequent encounter           HPI:     I had the pleasure of seeing this patient back in the office today in follow-up.  As you know she is a 68-year-old female who underwent odontoid screw fixation of odontoid fracture approximately 3 and half months ago.  Postoperatively she continues to do very well.  She describes no neck pain.  She has had no difficulty with swallowing or eating.  She indicates she is back to her normal level of activity.    Examination today demonstrates her neck incision to be well-healed.  She has good strength and sensation in both upper and lower extremities.  Gait is steady.    Follow-up spine x-rays performed May 20, 2024 are reviewed.  These x-rays demonstrate the odontoid screw to be in good position and unchanged from previous x-rays.  I did review these images in the office today.  Overall I think she is doing very well after surgery nearly 4 months ago.  I took this opportunity to answer remaining questions that she may have had.  I am schedule her to return my office in 3 months with a final set of cervical spine x-rays.  In the meantime at invite her to feel free to contact me should there be further problems or questions which I could be of assistance with in the interim.        Physical Exam:      BP (!) 159/77   Pulse 60   Ht 1.524 m (5')   Wt 68 kg (150 lb)   BMI 29.29 kg/m²         Assessment and Plan:     1. Open odontoid fracture with routine healing, subsequent encounter              Electronically signed by Frankie Chandler MD on 5/29/2024 at 3:17 PM    Please note that this

## 2024-05-29 NOTE — PROGRESS NOTES
Review of Systems   Neurological:  Positive for light-headedness.   All other systems reviewed and are negative.

## 2024-08-28 ENCOUNTER — OFFICE VISIT (OUTPATIENT)
Age: 69
End: 2024-08-28
Payer: MEDICARE

## 2024-08-28 VITALS
RESPIRATION RATE: 15 BRPM | DIASTOLIC BLOOD PRESSURE: 85 MMHG | HEART RATE: 78 BPM | SYSTOLIC BLOOD PRESSURE: 139 MMHG | WEIGHT: 150 LBS | HEIGHT: 60 IN | BODY MASS INDEX: 29.45 KG/M2

## 2024-08-28 DIAGNOSIS — S12.100K CLOSED ODONTOID FRACTURE WITH NONUNION, SUBSEQUENT ENCOUNTER: Primary | ICD-10-CM

## 2024-08-28 PROCEDURE — G8427 DOCREV CUR MEDS BY ELIG CLIN: HCPCS | Performed by: NEUROLOGICAL SURGERY

## 2024-08-28 PROCEDURE — 1123F ACP DISCUSS/DSCN MKR DOCD: CPT | Performed by: NEUROLOGICAL SURGERY

## 2024-08-28 PROCEDURE — 3017F COLORECTAL CA SCREEN DOC REV: CPT | Performed by: NEUROLOGICAL SURGERY

## 2024-08-28 PROCEDURE — 1036F TOBACCO NON-USER: CPT | Performed by: NEUROLOGICAL SURGERY

## 2024-08-28 PROCEDURE — G8400 PT W/DXA NO RESULTS DOC: HCPCS | Performed by: NEUROLOGICAL SURGERY

## 2024-08-28 PROCEDURE — G8419 CALC BMI OUT NRM PARAM NOF/U: HCPCS | Performed by: NEUROLOGICAL SURGERY

## 2024-08-28 PROCEDURE — 99213 OFFICE O/P EST LOW 20 MIN: CPT | Performed by: NEUROLOGICAL SURGERY

## 2024-08-28 PROCEDURE — 1090F PRES/ABSN URINE INCON ASSESS: CPT | Performed by: NEUROLOGICAL SURGERY

## 2024-08-28 NOTE — PROGRESS NOTES
[facility], [facility] NEUROSCIENCE [place], [facility] NEUROSURGERY  [address], [address]  [place] [id number]  Dept: [phone]  Dept Fax: [phone]     Patient:  Frieda Staples  YOB: 1955  Date: 8/28/24      Chief Complaint   Patient presents with    Follow-up     Cervical- 6 month follow up with XR's            HPI:     I had the pleasure of seeing this patient back in the office today in follow-up.  As you know she is a 68-year-old female who underwent placement of odontoid screw for a type II odontoid fracture approximately 6 months ago.  Postoperatively she has continued to do very well.  She does not describe any ongoing neck discomfort.  She has no complaints at today's visit.    Examination today demonstrates age-appropriate strength in both upper and lower extremities.  Sensory exam is intact.  Gait is steady.    Follow-up spine x-rays are reviewed.  These x-rays demonstrate the odontoid screw to be in good position.  There is no evidence of instability between flexion or extension positions I did review these images in the office today with the patient and her daughter.  I took this opportunity to answer intermittent questions that they may have had.  I did clearly invite both of them to feel free to contact me back any point in the future should the be further problems or questions which I could be of assistance with.        Physical Exam:      /85 (Site: Left Lower Arm, Position: Sitting, Cuff Size: Medium Adult)   Pulse 78   Resp 15   Ht 1.524 m (5')   Wt 68 kg (150 lb)   BMI 29.29 kg/m²         Assessment and Plan:     1. Closed odontoid fracture with nonunion, subsequent encounter              Electronically signed by Frankie Chandler MD on 8/28/2024 at 2:04 PM    Please note that this chart was generated using voice recognition Dragon dictation software.  Although every effort was made to ensure the accuracy of  this automated transcription, some errors in transcription may have occurred.

## (undated) DEVICE — LIQUIBAND RAPID ADHESIVE 36/CS 0.8ML: Brand: MEDLINE

## (undated) DEVICE — NEEDLE, QUINCKE, 18GX3.5": Brand: MEDLINE

## (undated) DEVICE — YANKAUER,FLEXIBLE HANDLE,REGLR CAPACITY: Brand: MEDLINE INDUSTRIES, INC.

## (undated) DEVICE — SUTURE VCRL + SZ 3-0 L18IN ABSRB UD SH 1/2 CIR TAPERCUT NDL VCP864D

## (undated) DEVICE — SUTURE MCRYL SZ 4-0 L27IN ABSRB UD L19MM PS-2 1/2 CIR PRIM Y426H

## (undated) DEVICE — 3.0MM PRECISION NEURO (MATCH HEAD)

## (undated) DEVICE — GLOVE SURG SZ 8 L12IN FNGR THK79MIL GRN LTX FREE

## (undated) DEVICE — INTENDED FOR TISSUE SEPARATION, AND OTHER PROCEDURES THAT REQUIRE A SHARP SURGICAL BLADE TO PUNCTURE OR CUT.: Brand: BARD-PARKER ® CARBON RIB-BACK BLADES

## (undated) DEVICE — GLOVE SURG SZ 75 CRM LTX FREE POLYISOPRENE POLYMER BEAD ANTI

## (undated) DEVICE — UCSS CANNULATED DRILL BIT STERILE: Brand: MEDTRONIC REUSABLE INSTRUMENT

## (undated) DEVICE — BLADE ES ELASTOMERIC COAT INSUL DURABLE BEND UPTO 90DEG

## (undated) DEVICE — SOLUTION IRRIG 1000ML 0.9% SOD CHL USP POUR PLAS BTL

## (undated) DEVICE — DRESSING TRNSPAR W4XL4.8IN W/ N ADH PD SURESITE-123 PLUSPD

## (undated) DEVICE — SYRINGE MED 30ML STD CLR PLAS LUERLOCK TIP N CTRL DISP

## (undated) DEVICE — INSTRUMENT 873-004 UCS GUIDEWIRE THRD

## (undated) DEVICE — DRAPE,THYROID,SOFT,STERILE: Brand: MEDLINE

## (undated) DEVICE — SYRINGE MED 10ML TRNSLUC BRL PLUNG BLK MRK POLYPR CTRL

## (undated) DEVICE — GLOVE SURG 7.5 11.7IN BEAD CUF LIGHT BRN SENSICARE LTX FREE

## (undated) DEVICE — Device

## (undated) DEVICE — NEEDLE HYPO 25GA L1.5IN BLU POLYPR HUB S STL REG BVL STR

## (undated) DEVICE — C-ARM: Brand: UNBRANDED

## (undated) DEVICE — BLANKET WRM W40.2XL55.9IN IORT LO BODY + MISTRAL AIR